# Patient Record
Sex: FEMALE | Race: BLACK OR AFRICAN AMERICAN | HISPANIC OR LATINO | Employment: FULL TIME | URBAN - METROPOLITAN AREA
[De-identification: names, ages, dates, MRNs, and addresses within clinical notes are randomized per-mention and may not be internally consistent; named-entity substitution may affect disease eponyms.]

---

## 2017-01-26 LAB
HBV SURFACE AG SERPL QL IA: NEGATIVE
HIV 1+2 AB+HIV1 P24 AG SERPL QL IA: NEGATIVE
RUBELLA ABY IGG: NORMAL

## 2017-03-13 ENCOUNTER — PRE VISIT (OUTPATIENT)
Dept: MATERNAL FETAL MEDICINE | Facility: CLINIC | Age: 37
End: 2017-03-13

## 2017-03-20 ENCOUNTER — OFFICE VISIT (OUTPATIENT)
Dept: MATERNAL FETAL MEDICINE | Facility: CLINIC | Age: 37
End: 2017-03-20
Attending: OBSTETRICS & GYNECOLOGY
Payer: COMMERCIAL

## 2017-03-20 ENCOUNTER — HOSPITAL ENCOUNTER (OUTPATIENT)
Dept: ULTRASOUND IMAGING | Facility: CLINIC | Age: 37
Discharge: HOME | End: 2017-03-20
Attending: OBSTETRICS & GYNECOLOGY | Admitting: OBSTETRICS & GYNECOLOGY
Payer: COMMERCIAL

## 2017-03-20 DIAGNOSIS — O26.90 PREGNANCY RELATED CONDITION, UNSPECIFIED TRIMESTER: ICD-10-CM

## 2017-03-20 DIAGNOSIS — O30.049 DICHORIONIC DIAMNIOTIC TWIN PREGNANCY: Primary | ICD-10-CM

## 2017-03-20 DIAGNOSIS — O30.049 DICHORIONIC DIAMNIOTIC TWIN PREGNANCY: ICD-10-CM

## 2017-03-20 DIAGNOSIS — O09.522 AMA (ADVANCED MATERNAL AGE) MULTIGRAVIDA 35+, SECOND TRIMESTER: Primary | ICD-10-CM

## 2017-03-20 DIAGNOSIS — O09.522 AMA (ADVANCED MATERNAL AGE) MULTIGRAVIDA 35+, SECOND TRIMESTER: ICD-10-CM

## 2017-03-20 PROCEDURE — 96040 ZZH GENETIC COUNSELING, EACH 30 MINUTES: CPT | Mod: ZF | Performed by: GENETIC COUNSELOR, MS

## 2017-03-20 PROCEDURE — 76812 OB US DETAILED ADDL FETUS: CPT

## 2017-03-20 NOTE — PROGRESS NOTES
[facility]  Genetic Counseling Consult    Patient:  Katie Soto YOB: 1980   Date of Service:  3/20/17      Katie Soto was seen with her  Radha at the [facility] for genetic consultation as part of her appointment for comprehensive ultrasound.  The indication for genetic counseling is advanced maternal age.       Impression/Plan:   1. Katie has declined serum screening in this pregnancy.    2. Katie had a comprehensive (level II) ultrasound today.  Please see the ultrasound report for further details.    3. The patient declines genetic amniocentesis and maternal serum screening today.    Pregnancy History:   /Parity:    Age at Delivery: 36 year old  VIVI: 2017, by Last Menstrual Period  Gestational Age: 18w4d    No significant complications or exposures were reported in the current pregnancy.    This is a di/di twin pregnancy.    Medical History:   Katie s reported medical history is not expected to impact pregnancy management or risks to fetal development.       Family History:   A three-generation pedigree was obtained, and is scanned under the  Media  tab.   The following significant findings were reported by Katie:    Patient works at home for [employer].   is a inpatient mental health worker.    They have a 4 year old who is healthy.    Otherwise, the reported family history is negative for multiple miscarriages, stillbirths, birth defects, mental retardation, known genetic conditions, and consanguinity.       Carrier Screening:   Patient is  and  is [place].  He states he is AA, thus not a carrier of sickle cell anemia.     Risk Assessment for Chromosome Conditions:   We explained that the risk for fetal chromosome abnormalities increases with maternal age. We discussed specific features of common chromosome abnormalities, including  Down syndrome, trisomy 13, trisomy 18, and sex chromosome trisomies.      - At age 36 at delivery with twins, the risk to have a baby with Down syndrome is 1 in 108.     - At age 36 at delivery with twins, the risk to have a baby with any chromosome abnormality is 1 in 53      Katie did not have maternal serum screening earlier in pregnancy.           Testing Options:   We discussed the following options:   Non-invasive Prenatal Testing (NIPT)    Maternal plasma cell-free DNA testing; first trimester ultrasound with nuchal translucency and nasal bone assessment is recommended, when appropriate    Screens for fetal trisomy 21, trisomy 13, trisomy 18, and sex chromosome aneuploidy    Cannot screen for open neural tube defects; maternal serum AFP after 15 weeks is recommended     Genetic Amniocentesis    Invasive procedure typically performed in the second trimester by which amniotic fluid is obtained for the purpose of chromosome analysis and/or other prenatal genetic analysis    Diagnostic results; >99% sensitivity for fetal chromosome abnormalities    AFAFP measurement tests for open neural tube defects     Comprehensive (Level II) ultrasound: Detailed ultrasound performed between 18-22 weeks gestation to screen for major birth defects and markers for aneuploidy.        We reviewed the benefits and limitations of this testing.  Screening tests provide a risk assessment specific to the pregnancy for certain fetal chromosome abnormalities, but cannot definitively diagnose or exclude a fetal chromosome abnormality.  Follow-up genetic counseling and consideration of diagnostic testing is recommended with any abnormal screening result.     Diagnostic tests carry inherent risks- including risk of miscarriage- that require careful consideration.  These tests can detect fetal chromosome abnormalities with greater than 99% certainty.  Results can be compromised by maternal cell contamination or mosaicism, and are limited  by the resolution of cytogenetic G-banding technology.  There is no screening nor diagnostic test that can detect all forms of birth defects or mental disability.    It was a pleasure to be involved with [facility] s care. Face-to-face time of the meeting was 30 minutes.      Saida Arthur, [facility]  Certified Genetic Counselor  Pager: [phone]

## 2017-03-20 NOTE — MR AVS SNAPSHOT
After Visit Summary   3/20/2017    Katie Soto    MRN: [id number]           Patient Information     Date Of Birth          1980        Visit Information        Provider Department      3/20/2017 1:30 PM Saida Arthur GC [facility] Maternal Fetal Medicine [facility]        Today's Diagnoses     AMA (advanced maternal age) multigravida 35+, second trimester    -  1    Pregnancy related condition, unspecified trimester        Dichorionic diamniotic twin pregnancy           Follow-ups after your visit        Your next 10 appointments already scheduled     Apr 10, 2017  9:30 AM CDT   M US COMPRE TWINS F/U with [id number]   [facility] Maternal Fetal Medicine Ultrasound - [facility] ([facility])    [address] [place] [address]  [facility] [address] [phone]           Wear comfortable clothes and leave your valuables at home.            Apr 10, 2017 10:00 AM CDT   Radiology MD with [facility] MD   [facility] Maternal Fetal Medicine [facility] ([facility])    [address] [place] [id number] [phone]           Please arrive at the time given for your first appointment.  This visit is used internally to schedule the physician's time during your ultrasound.              Future tests that were ordered for you today     Open Future Orders        Priority Expected Expires Ordered    MFM US Comprehensive Twins F/U Routine 4/17/2017 1/20/2018 3/20/2017            Who to contact     If you have questions or need follow up information about today's clinic visit or your schedule please contact [facility] FET[facility] directly at [phone].  Normal or non-critical lab and imaging results will be communicated to you by MyChart, letter or phone within 4 business days after the clinic has received the results. If you do not hear from us within 7 days, please contact the clinic through MyChart or phone. If you have a critical  "or abnormal lab result, we will notify you by phone as soon as possible.  Submit refill requests through [employer] or call your pharmacy and they will forward the refill request to us. Please allow 3 business days for your refill to be completed.          Additional Information About Your Visit        [employer] Information     [employer] lets you send messages to your doctor, view your test results, renew your prescriptions, schedule appointments and more. To sign up, go to [email or web address] . Click on \"Log in\" on the left side of the screen, which will take you to the Welcome page. Then click on \"Sign up Now\" on the right side of the page.     You will be asked to enter the access code listed below, as well as some personal information. Please follow the directions to create your username and password.     Your access code is: [email or web address]  Expires: 2017  3:34 PM     Your access code will  in 90 days. If you need help or a new code, please call your [place] clinic or [phone].        Care EveryWhere ID     This is your Care EveryWhere ID. This could be used by other organizations to access your [place] medical records  [id number]        Your Vitals Were     Last Period                   11/10/2016            Blood Pressure from Last 3 Encounters:   02/28/15 107/66   12 108/54   12 117/63    Weight from Last 3 Encounters:   02/28/15 93.4 kg (206 lb)   12 107.5 kg (237 lb)   12 102.1 kg (225 lb)              We Performed the Following     MFM Genetic Counseling        Primary Care Provider Office Phone # Fax #    Linda Meraz [phone]       [facility] [address]  [facility] [address]        Thank you!     Thank you for choosing [facility]  for your care. Our goal is always to provide you with excellent care. Hearing back from our patients is one way we can continue to improve our services. Please take a " few minutes to complete the written survey that you may receive in the mail after your visit with us. Thank you!             Your Updated Medication List - Protect others around you: Learn how to safely use, store and throw away your medicines at www.disposemymeds.org.          This list is accurate as of: 3/20/17  3:57 PM.  Always use your most recent med list.                   Brand Name Dispense Instructions for use    ibuprofen 400-800 mg tablet    ADVIL,MOTRIN    60 tablet    Take 1-2 tablets by mouth every 6 hours as needed (cramping).       PRENATAL VITAMINS PO      Take 1 tablet by mouth daily.       senna-docusate 8.6-50 MG per tablet    SENOKOT-S;PERICOLACE    60 tablet    Take 1-2 tablets by mouth 2 times daily.

## 2017-03-20 NOTE — MR AVS SNAPSHOT
"              After Visit Summary   3/20/2017    Katie Soto    MRN: [id number]           Patient Information     Date Of Birth          1980        Visit Information        Provider Department      3/20/2017 3:30 PM Brittani Bingham, DO [facility]  [place]        Today's Diagnoses     Dichorionic diamniotic twin pregnancy    -  1    AMA (advanced maternal age) multigravida 35+, second trimester           Follow-ups after your visit        Future tests that were ordered for you today     Open Future Orders        Priority Expected Expires Ordered    [facility] Comprehensive Twins F/U Routine 4/17/2017 1/20/2018 3/20/2017            Who to contact     If you have questions or need follow up information about today's clinic visit or your schedule please contact [facility] FETAL MEDIC[facility] directly at [phone].  Normal or non-critical lab and imaging results will be communicated to you by [employer], letter or phone within 4 business days after the clinic has received the results. If you do not hear from us within 7 days, please contact the clinic through [employer] or phone. If you have a critical or abnormal lab result, we will notify you by phone as soon as possible.  Submit refill requests through [employer] or call your pharmacy and they will forward the refill request to us. Please allow 3 business days for your refill to be completed.          Additional Information About Your Visit        [employer] Information     [employer] lets you send messages to your doctor, view your test results, renew your prescriptions, schedule appointments and more. To sign up, go to [email or web address] . Click on \"Log in\" on the left side of the screen, which will take you to the Welcome page. Then click on \"Sign up Now\" on the right side of the page.     You will be asked to enter the access code listed below, as well as some personal information. Please follow the directions to create " your username and password.     Your access code is: [email or web address]  Expires: 2017  3:34 PM     Your access code will  in 90 days. If you need help or a new code, please call your [place] clinic or [phone].        Care EveryWhere ID     This is your Care EveryWhere ID. This could be used by other organizations to access your [place] medical records  [id number]        Your Vitals Were     Last Period                   11/10/2016            Blood Pressure from Last 3 Encounters:   02/28/15 107/66   12 108/54   12 117/63    Weight from Last 3 Encounters:   02/28/15 93.4 kg (206 lb)   12 107.5 kg (237 lb)   12 102.1 kg (225 lb)               Primary Care Provider Office Phone # Fax #    Linda Meraz [phone]       [facility] [address] [facility] [address]  [facility] [address]        Thank you!     Thank you for choosing [facility]  for your care. Our goal is always to provide you with excellent care. Hearing back from our patients is one way we can continue to improve our services. Please take a few minutes to complete the written survey that you may receive in the mail after your visit with us. Thank you!             Your Updated Medication List - Protect others around you: Learn how to safely use, store and throw away your medicines at www.disposemymeds.org.          This list is accurate as of: 3/20/17  3:34 PM.  Always use your most recent med list.                   Brand Name Dispense Instructions for use    ibuprofen 400-800 mg tablet    ADVIL,MOTRIN    60 tablet    Take 1-2 tablets by mouth every 6 hours as needed (cramping).       PRENATAL VITAMINS PO      Take 1 tablet by mouth daily.       senna-docusate 8.6-50 MG per tablet    SENOKOT-S;PERICOLACE    60 tablet    Take 1-2 tablets by mouth 2 times daily.

## 2017-03-20 NOTE — PROGRESS NOTES
"Please see \"Imaging\" tab under \"Chart Review\" for details of today's US.      Brittani Bingham, DO  Maternal-Fetal Medicine  March 20, 2017      "

## 2017-04-10 ENCOUNTER — HOSPITAL ENCOUNTER (OUTPATIENT)
Dept: ULTRASOUND IMAGING | Facility: CLINIC | Age: 37
Discharge: HOME | End: 2017-04-10
Attending: OBSTETRICS & GYNECOLOGY | Admitting: OBSTETRICS & GYNECOLOGY
Payer: COMMERCIAL

## 2017-04-10 ENCOUNTER — OFFICE VISIT (OUTPATIENT)
Dept: MATERNAL FETAL MEDICINE | Facility: CLINIC | Age: 37
End: 2017-04-10
Attending: OBSTETRICS & GYNECOLOGY
Payer: COMMERCIAL

## 2017-04-10 DIAGNOSIS — O40.2XX2 POLYHYDRAMNIOS IN SECOND TRIMESTER, ANTEPARTUM COMPLICATION, FETUS 2: ICD-10-CM

## 2017-04-10 DIAGNOSIS — O09.522 AMA (ADVANCED MATERNAL AGE) MULTIGRAVIDA 35+, SECOND TRIMESTER: ICD-10-CM

## 2017-04-10 DIAGNOSIS — O30.049 DICHORIONIC DIAMNIOTIC TWIN PREGNANCY: Primary | ICD-10-CM

## 2017-04-10 DIAGNOSIS — O30.049 DICHORIONIC DIAMNIOTIC TWIN PREGNANCY: ICD-10-CM

## 2017-04-10 PROCEDURE — 76816 OB US FOLLOW-UP PER FETUS: CPT | Mod: 59

## 2017-04-10 NOTE — MR AVS SNAPSHOT
"                  MRN:[id number]                      After Visit Summary   4/10/2017    Katie Soto    MRN: [id number]           Visit Information        Provider Department      4/10/2017 10:00 AM Pierre Daley MD [facility]        [facility] Information     [employer] lets you send messages to your doctor, view your test results, renew your prescriptions, schedule appointments and more. To sign up, go to [email or web address] . Click on \"Log in\" on the left side of the screen, which will take you to the Welcome page. Then click on \"Sign up Now\" on the right side of the page.     You will be asked to enter the access code listed below, as well as some personal information. Please follow the directions to create your username and password.     Your access code is: [email or web address]  Expires: 2017  3:34 PM     Your access code will  in 90 days. If you need help or a new code, please call your [place] clinic or [phone].        Care EveryWhere ID     This is your Care EveryWhere ID. This could be used by other organizations to access your [place] medical records  [id number]        "

## 2017-04-10 NOTE — PROGRESS NOTES
Please refer to ultrasound report under 'Imaging' Studies of 'Chart Review' tabs.    Pierre Daley M.D.

## 2017-06-22 ENCOUNTER — HOSPITAL ENCOUNTER (INPATIENT)
Facility: CLINIC | Age: 37
LOS: 38 days | Discharge: HOME | End: 2017-07-30
Attending: OBSTETRICS & GYNECOLOGY | Admitting: OBSTETRICS & GYNECOLOGY
Payer: COMMERCIAL

## 2017-06-22 DIAGNOSIS — Z98.891 S/P PRIMARY LOW TRANSVERSE C-SECTION: ICD-10-CM

## 2017-06-22 LAB
ABO + RH BLD: NORMAL
ABO + RH BLD: NORMAL
ALBUMIN UR-MCNC: 10 MG/DL
APPEARANCE UR: CLEAR
BACTERIA #/AREA URNS HPF: ABNORMAL /HPF
BASOPHILS # BLD AUTO: 0 10E9/L (ref 0–0.2)
BASOPHILS NFR BLD AUTO: 0.2 %
BILIRUB UR QL STRIP: NEGATIVE
BLD GP AB SCN SERPL QL: NORMAL
BLOOD BANK CMNT PATIENT-IMP: NORMAL
COLOR UR AUTO: YELLOW
DIFFERENTIAL METHOD BLD: ABNORMAL
EOSINOPHIL # BLD AUTO: 0.1 10E9/L (ref 0–0.7)
EOSINOPHIL NFR BLD AUTO: 0.7 %
ERYTHROCYTE [DISTWIDTH] IN BLOOD BY AUTOMATED COUNT: 14.2 % (ref 10–15)
GLUCOSE UR STRIP-MCNC: NEGATIVE MG/DL
HCT VFR BLD AUTO: 33.1 % (ref 35–47)
HGB BLD-MCNC: 11.4 G/DL (ref 11.7–15.7)
HGB UR QL STRIP: NEGATIVE
IMM GRANULOCYTES # BLD: 0.1 10E9/L (ref 0–0.4)
IMM GRANULOCYTES NFR BLD: 0.7 %
KETONES UR STRIP-MCNC: 10 MG/DL
LEUKOCYTE ESTERASE UR QL STRIP: ABNORMAL
LYMPHOCYTES # BLD AUTO: 2.2 10E9/L (ref 0.8–5.3)
LYMPHOCYTES NFR BLD AUTO: 20.5 %
MCH RBC QN AUTO: 27.5 PG (ref 26.5–33)
MCHC RBC AUTO-ENTMCNC: 34.4 G/DL (ref 31.5–36.5)
MCV RBC AUTO: 80 FL (ref 78–100)
MONOCYTES # BLD AUTO: 1.2 10E9/L (ref 0–1.3)
MONOCYTES NFR BLD AUTO: 11.3 %
MUCOUS THREADS #/AREA URNS LPF: PRESENT /LPF
NEUTROPHILS # BLD AUTO: 7.2 10E9/L (ref 1.6–8.3)
NEUTROPHILS NFR BLD AUTO: 66.6 %
NITRATE UR QL: NEGATIVE
NRBC # BLD AUTO: 0 10*3/UL
NRBC BLD AUTO-RTO: 0 /100
PH UR STRIP: 7 PH (ref 5–7)
PLATELET # BLD AUTO: 258 10E9/L (ref 150–450)
RBC # BLD AUTO: 4.14 10E12/L (ref 3.8–5.2)
RBC #/AREA URNS AUTO: 2 /HPF (ref 0–2)
SP GR UR STRIP: 1.01 (ref 1–1.03)
SPECIMEN EXP DATE BLD: NORMAL
SQUAMOUS #/AREA URNS AUTO: 2 /HPF (ref 0–1)
URN SPEC COLLECT METH UR: ABNORMAL
UROBILINOGEN UR STRIP-MCNC: 2 MG/DL (ref 0–2)
WBC # BLD AUTO: 10.7 10E9/L (ref 4–11)
WBC #/AREA URNS AUTO: 7 /HPF (ref 0–2)

## 2017-06-22 PROCEDURE — 86850 RBC ANTIBODY SCREEN: CPT | Performed by: OBSTETRICS & GYNECOLOGY

## 2017-06-22 PROCEDURE — 25000125 ZZHC RX 250: Performed by: OBSTETRICS & GYNECOLOGY

## 2017-06-22 PROCEDURE — 25000125 ZZHC RX 250

## 2017-06-22 PROCEDURE — 85025 COMPLETE CBC W/AUTO DIFF WBC: CPT | Performed by: OBSTETRICS & GYNECOLOGY

## 2017-06-22 PROCEDURE — 36415 COLL VENOUS BLD VENIPUNCTURE: CPT | Performed by: OBSTETRICS & GYNECOLOGY

## 2017-06-22 PROCEDURE — 81001 URINALYSIS AUTO W/SCOPE: CPT | Performed by: OBSTETRICS & GYNECOLOGY

## 2017-06-22 PROCEDURE — 12000029 ZZH R&B OB INTERMEDIATE

## 2017-06-22 PROCEDURE — 86900 BLOOD TYPING SEROLOGIC ABO: CPT | Performed by: OBSTETRICS & GYNECOLOGY

## 2017-06-22 PROCEDURE — 86780 TREPONEMA PALLIDUM: CPT | Performed by: OBSTETRICS & GYNECOLOGY

## 2017-06-22 PROCEDURE — 86901 BLOOD TYPING SEROLOGIC RH(D): CPT | Performed by: OBSTETRICS & GYNECOLOGY

## 2017-06-22 PROCEDURE — 25000128 H RX IP 250 OP 636: Performed by: OBSTETRICS & GYNECOLOGY

## 2017-06-22 RX ORDER — MAGNESIUM SULFATE IN WATER 40 MG/ML
2 INJECTION, SOLUTION INTRAVENOUS CONTINUOUS
Status: DISCONTINUED | OUTPATIENT
Start: 2017-06-22 | End: 2017-06-23

## 2017-06-22 RX ORDER — SODIUM CHLORIDE, SODIUM LACTATE, POTASSIUM CHLORIDE, CALCIUM CHLORIDE 600; 310; 30; 20 MG/100ML; MG/100ML; MG/100ML; MG/100ML
INJECTION, SOLUTION INTRAVENOUS CONTINUOUS
Status: DISCONTINUED | OUTPATIENT
Start: 2017-06-22 | End: 2017-06-23

## 2017-06-22 RX ORDER — TERBUTALINE SULFATE 1 MG/ML
INJECTION, SOLUTION SUBCUTANEOUS
Status: COMPLETED
Start: 2017-06-22 | End: 2017-06-22

## 2017-06-22 RX ORDER — ONDANSETRON 2 MG/ML
4 INJECTION INTRAMUSCULAR; INTRAVENOUS EVERY 6 HOURS PRN
Status: DISCONTINUED | OUTPATIENT
Start: 2017-06-22 | End: 2017-07-04

## 2017-06-22 RX ORDER — TERBUTALINE SULFATE 1 MG/ML
0.25 INJECTION, SOLUTION SUBCUTANEOUS
Status: COMPLETED | OUTPATIENT
Start: 2017-06-22 | End: 2017-06-22

## 2017-06-22 RX ORDER — CALCIUM GLUCONATE 94 MG/ML
1 INJECTION, SOLUTION INTRAVENOUS
Status: DISCONTINUED | OUTPATIENT
Start: 2017-06-22 | End: 2017-07-06

## 2017-06-22 RX ORDER — MAGNESIUM SULFATE HEPTAHYDRATE 40 MG/ML
4 INJECTION, SOLUTION INTRAVENOUS ONCE
Status: COMPLETED | OUTPATIENT
Start: 2017-06-22 | End: 2017-06-22

## 2017-06-22 RX ADMIN — TERBUTALINE SULFATE 0.25 MG: 1 INJECTION SUBCUTANEOUS at 12:00

## 2017-06-22 RX ADMIN — MAGNESIUM SULFATE IN WATER 4 G: 40 INJECTION, SOLUTION INTRAVENOUS at 15:31

## 2017-06-22 RX ADMIN — TERBUTALINE SULFATE 0.25 MG: 1 INJECTION, SOLUTION SUBCUTANEOUS at 12:00

## 2017-06-22 RX ADMIN — MAGNESIUM SULFATE IN WATER 2 G/HR: 40 INJECTION, SOLUTION INTRAVENOUS at 16:10

## 2017-06-22 NOTE — IP AVS SNAPSHOT
[address]    [place] [address]    Phone:  [phone]                                       After Visit Summary   6/22/2017    Katie Soto    MRN: [id number]           After Visit Summary Signature Page     I have received my discharge instructions, and my questions have been answered. I have discussed any challenges I see with this plan with the nurse or doctor.    ..........................................................................................................................................  Patient/Patient Representative Signature      ..........................................................................................................................................  Patient Representative Print Name and Relationship to Patient    ..................................................               ................................................  Date                                            Time    ..........................................................................................................................................  Reviewed by Signature/Title    ...................................................              ..............................................  Date                                                            Time

## 2017-06-22 NOTE — PLAN OF CARE
1125 -  at 32+2/7 weeks with TWINS presents to [facility] from OB clinic for eval of PTL.  Patient denies contractions, VB or LOF, but has been slowly changing her cervix with every OB appt for the past few weeks.  Cervix today per Dr. Meraz in clinic was 4-.  POC discussed including monitoring, possible medications for contractions.  Patient agrees to POC.  Monitors applied.  Admission assessment completed.  Monitors being held in place in attempt to monitor both babies.      1200 - Terbutaline discussed with patient and  who verbally agree to med.  Terbutaline given per Dr. Meraz's orders.  Dr. Meraz at bedside for eval.  FHT strip reviewed.  Order for NST and removal of EUS received.  Continuous toco to remain in place.  Patient agrees.    1225 - Difficulty tracing 2 FHTs'.  Monitors adjusted.  Two FHT tracing noted with holding monitors in place.      1252 - FHT's reassuring and reactive x2.  EUS removed.  Bennet remains.  Patient agrees.  Patient noting contractions as seen on monitor.

## 2017-06-22 NOTE — IP AVS SNAPSHOT
MRN:[id number]                      After Visit Summary   2017    Katie Soto    MRN: [id number]           Thank you!     Thank you for choosing [facility] for your care. Our goal is always to provide you with excellent care. Hearing back from our patients is one way we can continue to improve our services. Please take a few minutes to complete the written survey that you may receive in the mail after you visit with us. Thank you!        Patient Information     Date Of Birth          1980        About your hospital stay     You were admitted on:  2017 You last received care in the:  [address]    You were discharged on:  2017       Who to Call     For medical emergencies, please call 911.  For non-urgent questions about your medical care, please call your primary care provider or clinic, [phone]  For questions related to your surgery, please call your surgery clinic        Attending Provider     Provider Specialty    Tiago Dozier MD OB/Gyn    Linda Meraz MD OB/Gyn       Primary Care Provider Office Phone # Fax #    Maggie CHAVA Davenport [phone]      After Care Instructions     Activity       Review discharge instructions            Diet       Resume previous diet            Discharge Instructions - Gestational diabetic patients       Gestational diabetic patients to follow up for fasting blood sugar and 2 hour 75gm glucose load at 6 weeks postpartum.            Discharge Instructions - Postpartum visit       Schedule postpartum visit with your provider and return to clinic in 6 weeks.                  Further instructions from your care team       Postop  Birth Instructions    Activity       Do not lift more than 10 pounds for 6 weeks after surgery.  Ask family and friends for help when you need it.    No driving until you have stopped taking your pain medications (usually two weeks after  surgery).    No heavy exercise or activity for 6 weeks.  Don't do anything that will put a strain on your surgery site.    Don't strain when using the toilet.  Your care team may prescribe a stool softener if you have problems with your bowel movements.     To care for your incision:       Keep the incision clean and dry.    Do not soak your incision in water. No swimming or hot tubs until it has fully healed. You may soak in the bathtub if the water level is below your incision.    Do not use peroxide, gel, cream, lotion, or ointment on your incision.    Adjust your clothes to avoid pressure on your surgery site (check the elastic in your underwear for example).     You may see a small amount of clear or pink drainage and this is normal.  Check with your health care provider:       If the drainage increases or has an odor.    If the incision reddens, you have swelling, or develop a rash.    If you have increased pain and the medicine we prescribed doesn't help.    If you have a fever above 100.4 F (38 C) with or without chills when placing thermometer under your tongue.   The area around your incision (surgery wound), will feel numb.  This is normal. The numbness should go away in less than a year.     Keep your hands clean:  Always wash your hands before touching your incision (surgery wound). This helps reduce your risk of infection. If your hands aren't dirty, you may use an alcohol hand-rub to clean your hands. Keep your nails clean and short.    Call your healthcare provider if you have any of these symptoms:       You soak a sanitary pad with blood within 1 hour, or you see blood clots larger than a golf ball.    Bleeding that lasts more than 6 weeks.    Vaginal discharge that smells bad.    Severe pain, cramping or tenderness in your lower belly area.    A need to urinate more frequently (use the toilet more often), more urgently (use the toilet very quickly), or it burns when you urinate.    Nausea and  "vomiting.    Redness, swelling or pain around a vein in your leg.    Problems breastfeeding or a red or painful area on your breast.    Chest pain and cough or are gasping for air.    Problems with coping with sadness, anxiety or depression. If you have concerns about hurting yourself or the baby, call your provider immediately.      You have questions or concerns after you return home.                  Pending Results     No orders found from 2017 to 2017.            Statement of Approval     Ordered          17 0651  I have reviewed and agree with all the recommendations and orders detailed in this document.  EFFECTIVE NOW     Approved and electronically signed by:  Jon Sarah MD             Admission Information     Date & Time Provider Department Dept. Phone    2017 Linda Meraz MD [address] [phone]      Your Vitals Were     Blood Pressure Pulse Temperature Respirations Height Weight    118/73 74 98.7  F (37.1  C) (Oral) 18 1.676 m (5' 6\") 113.7 kg (250 lb 12 oz)    Last Period Pulse Oximetry BMI (Body Mass Index)             11/10/2016 100% 40.47 kg/m2         [employer] Information     [employer] lets you send messages to your doctor, view your test results, renew your prescriptions, schedule appointments and more. To sign up, go to [email or web address] . Click on \"Log in\" on the left side of the screen, which will take you to the Welcome page. Then click on \"Sign up Now\" on the right side of the page.     You will be asked to enter the access code listed below, as well as some personal information. Please follow the directions to create your username and password.     Your access code is: [email or web address]  Expires: 10/28/2017  9:53 AM     Your access code will  in 90 days. If you need help or a new code, please call your [facility] or [phone].        Care EveryWhere ID     This is your Care EveryWhere ID. This could be used by other " organizations to access your [facility] medical records  [id number]        Equal Access to Services     PRANEETH SOOD : Hadii davi Leon, wapiotrda ivette, qajohnta brandonmatony montes. So [facility] [phone].    ATENCIÓN: Si habla español, tiene a rodas disposición servicios gratuitos de asistencia lingüística. Llame al [phone].    We comply with applicable federal civil rights laws and [place] laws. We do not discriminate on the basis of race, color, national origin, age, disability sex, sexual orientation or gender identity.               Review of your medicines      UNREVIEWED medicines. Ask your doctor about these medicines        Dose / Directions    breast pump Misc   Used for:  Postpartum care and examination of lactating mother   Ask about: Should I take this medication?        Dose:  1 each   1 each once for 1 dose   Quantity:  1 each   Refills:  0         START taking        Dose / Directions    ibuprofen 400 MG tablet   Commonly known as:  ADVIL/MOTRIN        Dose:  400 mg   Take 1 tablet (400 mg) by mouth every 6 hours as needed for other (cramping)   Quantity:  40 tablet   Refills:  0       oxyCODONE 5 MG IR tablet   Commonly known as:  ROXICODONE        Dose:  5 mg   Take 1 tablet (5 mg) by mouth every 4 hours as needed for moderate to severe pain   Quantity:  30 tablet   Refills:  0         CONTINUE these medicines which have NOT CHANGED        Dose / Directions    albuterol 108 (90 BASE) MCG/ACT Inhaler   Commonly known as:  PROAIR HFA/PROVENTIL HFA/VENTOLIN HFA        Dose:  2 puff   Inhale 2 puffs into the lungs   Refills:  0       PRENATAL VITAMINS PO        Dose:  1 tablet   Take 1 tablet by mouth daily.   Refills:  0            Where to get your medicines      These medications were sent to [facility] - [address]  [address] [place] [address]     Phone:  [phone]     ibuprofen 400 MG  tablet         Some of these will need a paper prescription and others can be bought over the counter. Ask your nurse if you have questions.     Bring a paper prescription for each of these medications     breast pump Misc    oxyCODONE 5 MG IR tablet                Protect others around you: Learn how to safely use, store and throw away your medicines at www.disposemymeds.org.             Medication List: This is a list of all your medications and when to take them. Check marks below indicate your daily home schedule. Keep this list as a reference.      Medications           Morning Afternoon Evening Bedtime As Needed    albuterol 108 (90 BASE) MCG/ACT Inhaler   Commonly known as:  PROAIR HFA/PROVENTIL HFA/VENTOLIN HFA   Inhale 2 puffs into the lungs                                ibuprofen 400 MG tablet   Commonly known as:  ADVIL/MOTRIN   Take 1 tablet (400 mg) by mouth every 6 hours as needed for other (cramping)   Last time this was given:  800 mg on 7/30/2017  9:07 AM                                oxyCODONE 5 MG IR tablet   Commonly known as:  ROXICODONE   Take 1 tablet (5 mg) by mouth every 4 hours as needed for moderate to severe pain   Last time this was given:  5 mg on 7/30/2017  9:07 AM                                PRENATAL VITAMINS PO   Take 1 tablet by mouth daily.                                  ASK your doctor about these medications           Morning Afternoon Evening Bedtime As Needed    breast pump Misc   1 each once for 1 dose   Ask about: Should I take this medication?

## 2017-06-22 NOTE — PLAN OF CARE
1430-Report received from Marianne Uriarte RN. Care of pt assumed.   1445-Verbal consent for EFM/[place] received. Monitors applied.   1510-PIV inserted without difficulty.   1530-Bedside report to Landy Abad RN to assume care of pt.

## 2017-06-22 NOTE — PLAN OF CARE
Received updated orders from Dr. Godfrey regarding regular adult diet and additional dose of Betamethasone.  Regular adult diet ordered and no dose of beta at this time.  Magnesium sulfate infusing at 2 grams an hour

## 2017-06-23 PROBLEM — O60.03 PRETERM LABOR IN THIRD TRIMESTER: Status: ACTIVE | Noted: 2017-06-23

## 2017-06-23 LAB — T PALLIDUM IGG+IGM SER QL: NEGATIVE

## 2017-06-23 PROCEDURE — 12000029 ZZH R&B OB INTERMEDIATE

## 2017-06-23 PROCEDURE — 25000128 H RX IP 250 OP 636: Performed by: OBSTETRICS & GYNECOLOGY

## 2017-06-23 PROCEDURE — 25000132 ZZH RX MED GY IP 250 OP 250 PS 637: Performed by: OBSTETRICS & GYNECOLOGY

## 2017-06-23 PROCEDURE — 25000125 ZZHC RX 250: Performed by: OBSTETRICS & GYNECOLOGY

## 2017-06-23 RX ORDER — NIFEDIPINE 10 MG/1
20 CAPSULE ORAL EVERY 30 MIN PRN
Status: DISCONTINUED | OUTPATIENT
Start: 2017-06-23 | End: 2017-07-06

## 2017-06-23 RX ORDER — NIFEDIPINE 10 MG/1
20 CAPSULE ORAL EVERY 6 HOURS
Status: COMPLETED | OUTPATIENT
Start: 2017-06-23 | End: 2017-07-06

## 2017-06-23 RX ORDER — NIFEDIPINE 10 MG/1
20 CAPSULE ORAL ONCE
Status: COMPLETED | OUTPATIENT
Start: 2017-06-23 | End: 2017-06-27

## 2017-06-23 RX ADMIN — MAGNESIUM SULFATE IN WATER 2 G/HR: 40 INJECTION, SOLUTION INTRAVENOUS at 02:04

## 2017-06-23 RX ADMIN — NIFEDIPINE 10 MG: 10 CAPSULE, LIQUID FILLED ORAL at 16:32

## 2017-06-23 RX ADMIN — NIFEDIPINE 20 MG: 10 CAPSULE, LIQUID FILLED ORAL at 22:27

## 2017-06-23 RX ADMIN — SODIUM CHLORIDE, POTASSIUM CHLORIDE, SODIUM LACTATE AND CALCIUM CHLORIDE: 600; 310; 30; 20 INJECTION, SOLUTION INTRAVENOUS at 02:19

## 2017-06-23 NOTE — PROGRESS NOTES
HD 2  37 yo  at 32+1 with di/di twin, PTL, advanced cervical dilation    Venedocia occ  NST reactive x 2 this am  Cx unchanged per RN exam. No cx check by MD this am since patient is stable    Continue magnesium until 1500  Observe off magnesium and consider Nifedipine po if necessary  Possible discharge on  if stable

## 2017-06-23 NOTE — PLAN OF CARE
1300- Dr Alexander updated regarding sl. Irritability with 2 ctx lasting approx. 120 sec at 1300- Pt rated ctx at 7.    1600- reactive NST x 2- NON per Dr KETURAH Dozier to start Nifedipine 10mg q 6 hr -plan is to observe pt overnight and re evaluate in AM. Pt verbally acknowledges plan with understanding.

## 2017-06-23 NOTE — PLAN OF CARE
2230: Dr. DAYA Dozier notified on attempt to monitor FHTs. Was able to get a reactive strip on baby A, but unable to keep baby B on monitor, was able to get a 10 min strip with moderate variability, and RN could palpate frequent fetal movement. Dr. Dozier was comfortable with taking both babies off the monitor and monitoring again at 0600. Plan to continue continuous TOCO. Also updated on cxt frequency.   2315: Dr. DAYA Dozier in department, viewed previous FHT strip. Plan remains the same to monitor babies again at 0600.  0430: Patient c/o increased back pain, encouraged to void, heat packs applied. Pt also feeling pressure as if she needs to have a bowel movement, SVE 5.5/85/-2  0500: After bowel movement pt no longer feeling rectal pressure. Back pain also improved.  0700: Dr. DAYA Dozier at bedside to round on patient. Reviewed FHTs, OK to monitor FHT qshift with continuous TOCO. Plan to possibly shut of Magnesium this afternoon, MD will follow up with day shift RN later today.  0715: Report given to Azeb STAPLETON RN who will assume cares       Addendum: Above note states Dr. RICH Dozier was managing patient, but correction- Dr. KETURAH Dozier was on call and managing patient.

## 2017-06-23 NOTE — PLAN OF CARE
1900  Dr. Dozier updated that despite continuous attempts to obtain fetal tracing on baby B, unable to keep fetal tracing on baby B on the monitor.  Heart tones obtained per doppler, heart tones 145 with doppler used.  Order received for continuous toco and to obtain NST at 2100.  Plan explained to patient who is in agreement with plan

## 2017-06-23 NOTE — PROGRESS NOTES
Patient has been off magnesium since 1300. Though her contractions are not regular, there is a subtle sense of increased uterine activity. Plan to start po Nifedipine. Due to prematurity and advanced cervical dilation, plan is to hold off on discharge until she has shown prolonged stability.

## 2017-06-24 PROCEDURE — 25000132 ZZH RX MED GY IP 250 OP 250 PS 637: Performed by: OBSTETRICS & GYNECOLOGY

## 2017-06-24 PROCEDURE — 12000029 ZZH R&B OB INTERMEDIATE

## 2017-06-24 RX ADMIN — NIFEDIPINE 20 MG: 10 CAPSULE, LIQUID FILLED ORAL at 22:45

## 2017-06-24 RX ADMIN — NIFEDIPINE 20 MG: 10 CAPSULE, LIQUID FILLED ORAL at 04:49

## 2017-06-24 RX ADMIN — NIFEDIPINE 10 MG: 10 CAPSULE, LIQUID FILLED ORAL at 10:34

## 2017-06-24 RX ADMIN — NIFEDIPINE 10 MG: 10 CAPSULE, LIQUID FILLED ORAL at 10:28

## 2017-06-24 NOTE — PROGRESS NOTES
"HD3  Overnight feels ctx 3/hr. Occ has vaginal pain and pressure. Denies LOF. Continues on nifedipine 20 mg q6hrs.    /62  Pulse 86  Temp 99  F (37.2  C)  Resp 16  Ht 1.676 m (5' 6\")  Wt 111.1 kg (245 lb)  LMP 11/10/2016  BMI 39.54 kg/m2   NAD  Abd: Soft, NT  Ext: NT, no edema  SVE 5.5/80/-2, soft    A/P: 36 year old  @ 32w2d with di/di twins, arrested PTL and ACD. Continues on nifedipine tocolysis.  - Given persistent contractions, pt lives 45 minutes away, will keep inpatient on bedrest and nifedipine.  - Reviewed mode of delivery. She has h/o  7.5# baby and plans vaginal delivery. Babies were most recently vtx/transverse. Reviewed delivery options for non-vertex second twin. I discussed breech extraction vs. External cephalic version vs.  section. I would recommend breech extraction, discussed risks to baby, however she is a good candidate given previous delivery and size of current babies.   - Jain: pt will refuse blood transfusion, even in event of life-threatening blood loss. She would accept use of cell saver and transfusion of her own blood.  - If not delivered by Monday, will get US for BPP    Ira Dozier   "

## 2017-06-24 NOTE — PLAN OF CARE
"2100 pt states she is having some back pain, rates it as mild discomfort, declines any pain meds. Also states she is feeling some vaginal and rectal pressure. 1-3 contractions per hour with some irritability noted. SVE performed, pt 5.5/85/-2.     2230. FHTs of fetus A and B both reactive. Nifedipine given as ordered. VSS    0430. Pt has slept quietly all night. States she felt some mild back pain \"at some point through the night, but its better now\". Nifedipine given as ordered. VV  "

## 2017-06-25 ENCOUNTER — APPOINTMENT (OUTPATIENT)
Dept: ULTRASOUND IMAGING | Facility: CLINIC | Age: 37
End: 2017-06-25
Attending: OBSTETRICS & GYNECOLOGY
Payer: COMMERCIAL

## 2017-06-25 LAB
ABO + RH BLD: NORMAL
ABO + RH BLD: NORMAL
ALBUMIN SERPL-MCNC: 2.6 G/DL (ref 3.4–5)
ALBUMIN UR-MCNC: NEGATIVE MG/DL
ALP SERPL-CCNC: 151 U/L (ref 40–150)
ALT SERPL W P-5'-P-CCNC: 34 U/L (ref 0–50)
APPEARANCE UR: CLEAR
AST SERPL W P-5'-P-CCNC: 21 U/L (ref 0–45)
BILIRUB DIRECT SERPL-MCNC: 0.1 MG/DL (ref 0–0.2)
BILIRUB SERPL-MCNC: 0.6 MG/DL (ref 0.2–1.3)
BILIRUB UR QL STRIP: NEGATIVE
COLOR UR AUTO: YELLOW
ERYTHROCYTE [DISTWIDTH] IN BLOOD BY AUTOMATED COUNT: 14.3 % (ref 10–15)
GLUCOSE UR STRIP-MCNC: NEGATIVE MG/DL
HCT VFR BLD AUTO: 33.2 % (ref 35–47)
HGB BLD-MCNC: 11 G/DL (ref 11.7–15.7)
HGB UR QL STRIP: NEGATIVE
KETONES UR STRIP-MCNC: 5 MG/DL
LEUKOCYTE ESTERASE UR QL STRIP: NEGATIVE
MCH RBC QN AUTO: 26.6 PG (ref 26.5–33)
MCHC RBC AUTO-ENTMCNC: 33.1 G/DL (ref 31.5–36.5)
MCV RBC AUTO: 80 FL (ref 78–100)
MUCOUS THREADS #/AREA URNS LPF: PRESENT /LPF
NITRATE UR QL: NEGATIVE
PH UR STRIP: 6.5 PH (ref 5–7)
PLATELET # BLD AUTO: 243 10E9/L (ref 150–450)
PROT SERPL-MCNC: 6.4 G/DL (ref 6.8–8.8)
RBC # BLD AUTO: 4.13 10E12/L (ref 3.8–5.2)
RBC #/AREA URNS AUTO: <1 /HPF (ref 0–2)
SP GR UR STRIP: 1.01 (ref 1–1.03)
SPECIMEN EXP DATE BLD: NORMAL
SQUAMOUS #/AREA URNS AUTO: <1 /HPF (ref 0–1)
URN SPEC COLLECT METH UR: ABNORMAL
UROBILINOGEN UR STRIP-MCNC: NORMAL MG/DL (ref 0–2)
WBC # BLD AUTO: 9.8 10E9/L (ref 4–11)
WBC #/AREA URNS AUTO: 1 /HPF (ref 0–2)

## 2017-06-25 PROCEDURE — 25000132 ZZH RX MED GY IP 250 OP 250 PS 637: Performed by: OBSTETRICS & GYNECOLOGY

## 2017-06-25 PROCEDURE — 83789 MASS SPECTROMETRY QUAL/QUAN: CPT | Performed by: OBSTETRICS & GYNECOLOGY

## 2017-06-25 PROCEDURE — 76819 FETAL BIOPHYS PROFIL W/O NST: CPT

## 2017-06-25 PROCEDURE — 36415 COLL VENOUS BLD VENIPUNCTURE: CPT | Performed by: OBSTETRICS & GYNECOLOGY

## 2017-06-25 PROCEDURE — 85027 COMPLETE CBC AUTOMATED: CPT | Performed by: OBSTETRICS & GYNECOLOGY

## 2017-06-25 PROCEDURE — 86901 BLOOD TYPING SEROLOGIC RH(D): CPT | Performed by: OBSTETRICS & GYNECOLOGY

## 2017-06-25 PROCEDURE — 80076 HEPATIC FUNCTION PANEL: CPT | Performed by: OBSTETRICS & GYNECOLOGY

## 2017-06-25 PROCEDURE — 12000029 ZZH R&B OB INTERMEDIATE

## 2017-06-25 PROCEDURE — 81001 URINALYSIS AUTO W/SCOPE: CPT | Performed by: OBSTETRICS & GYNECOLOGY

## 2017-06-25 PROCEDURE — 87653 STREP B DNA AMP PROBE: CPT | Performed by: OBSTETRICS & GYNECOLOGY

## 2017-06-25 RX ORDER — DOCUSATE SODIUM 100 MG/1
100 CAPSULE, LIQUID FILLED ORAL 2 TIMES DAILY PRN
Status: DISCONTINUED | OUTPATIENT
Start: 2017-06-25 | End: 2017-07-27

## 2017-06-25 RX ORDER — ACETAMINOPHEN 325 MG/1
650 TABLET ORAL EVERY 4 HOURS PRN
Status: DISCONTINUED | OUTPATIENT
Start: 2017-06-25 | End: 2017-07-27

## 2017-06-25 RX ORDER — DIPHENHYDRAMINE HCL 25 MG
25-50 CAPSULE ORAL EVERY 6 HOURS PRN
Status: DISCONTINUED | OUTPATIENT
Start: 2017-06-25 | End: 2017-07-27

## 2017-06-25 RX ADMIN — NIFEDIPINE 20 MG: 10 CAPSULE, LIQUID FILLED ORAL at 22:33

## 2017-06-25 RX ADMIN — NIFEDIPINE 20 MG: 10 CAPSULE, LIQUID FILLED ORAL at 10:21

## 2017-06-25 RX ADMIN — NIFEDIPINE 20 MG: 10 CAPSULE, LIQUID FILLED ORAL at 16:23

## 2017-06-25 RX ADMIN — NIFEDIPINE 20 MG: 10 CAPSULE, LIQUID FILLED ORAL at 04:51

## 2017-06-25 RX ADMIN — DIPHENHYDRAMINE HYDROCHLORIDE 25 MG: 25 CAPSULE ORAL at 23:07

## 2017-06-25 RX ADMIN — ACETAMINOPHEN 650 MG: 325 TABLET, FILM COATED ORAL at 20:01

## 2017-06-25 NOTE — PROVIDER NOTIFICATION
At 0440 both baby A and baby B placed on monitors for NST. Baby A has reactive strip. At 0545 still unable to obtain reactive strip on baby B. FHT's 130's to 140's with moderate variability. Pt states she feels baby moving, fetal movements noted by RN. Notified Dr RICH Dozier at 0555. Orders received to get BPP on pt today.

## 2017-06-25 NOTE — PROVIDER NOTIFICATION
2230-applied fetal monitor to both baby A and baby B for q shift NST. Baby A captures well. FHTs 140's, moderate variability, accels noted. Baby B is difficult to trace due to position and fetal movement.     2330- baby B continues to be difficult to trace. FHT's noted to be in the 140's to 150's. Pt states that baby has been very active. RN notes fetal movent while attempting to trace. Dr DAYA Dozier notified about NST. Orders received to retry NST later in the shift.

## 2017-06-25 NOTE — PROGRESS NOTES
"Feels less crampy in the last 24 hrs, continues on nifedipine. Notes new onset itching of her hands. RN had difficulty tracing Baby B, although audibly had good variability and accels overnight. BPP this AM 8/8 x 2.   /59  Pulse 93  Temp 98.1  F (36.7  C)  Resp 16  Ht 1.676 m (5' 6\")  Wt 111.1 kg (245 lb)  LMP 11/10/2016  BMI 39.54 kg/m2   NAD  Abd: Soft, NT  Cervix: Deferred    [place]: occasional  FHT  BPP vtx/oblique, 8/8 x 2    A/P: HD#4, 36 year old  @ 32w3d with di/di twins, arrested PTL and ACD  1. Continue on nifedipine tocolysis. S/p BMZ.  2. UA on  showed large LE. Pt denies dysuria or frequency, will repeat UA today  3. Itching of hands: check LFTs, CBC and bile acids  4. GEETA: Pt desires vaginal delivery, is prepared for non-vertex second twin delivering vaginally through breech extraction  5. T&S today to keep active  6. NST q shift. BPP twice weekly (done today)  7. Will send Group B strep culture  8. Will keep inpatient due to ACD and social situation- lives 45 minutes away. Has remained stable, can consider discharge to home at 33-34 weeks if stable.  9. Anabaptism: will refuse blood products other than cell saver, even in event of death. Will sign blood product refusal form.    Ira Dozier   "

## 2017-06-25 NOTE — PLAN OF CARE
1100- Dr RICH Dozier here to assess pt- labs ordered for c/o intermittent itching of hands-GBS sent- U/A sent- BPP 8/8 x2.  1830- C/O lower back ache - heating pad applied-skin W&D without trauma- occasional uterine cramping noted .  Pt states she is not allergic to Penicillamine, but she is allergic to PCN, allergy band updated.

## 2017-06-26 LAB
GP B STREP DNA SPEC QL NAA+PROBE: NORMAL
SPECIMEN SOURCE: NORMAL

## 2017-06-26 PROCEDURE — 99233 SBSQ HOSP IP/OBS HIGH 50: CPT | Performed by: NURSE PRACTITIONER

## 2017-06-26 PROCEDURE — 25000132 ZZH RX MED GY IP 250 OP 250 PS 637: Performed by: OBSTETRICS & GYNECOLOGY

## 2017-06-26 PROCEDURE — 12000029 ZZH R&B OB INTERMEDIATE

## 2017-06-26 PROCEDURE — 99207 ZZC CONSULT E&M CHANGED TO SUBSEQUENT LEVEL: CPT | Performed by: NURSE PRACTITIONER

## 2017-06-26 RX ORDER — PRENATAL VIT/IRON FUM/FOLIC AC 27MG-0.8MG
1 TABLET ORAL DAILY
Status: DISCONTINUED | OUTPATIENT
Start: 2017-06-26 | End: 2017-07-27

## 2017-06-26 RX ADMIN — NIFEDIPINE 20 MG: 10 CAPSULE, LIQUID FILLED ORAL at 04:45

## 2017-06-26 RX ADMIN — NIFEDIPINE 20 MG: 10 CAPSULE, LIQUID FILLED ORAL at 22:30

## 2017-06-26 RX ADMIN — PRENATAL VIT W/ FE FUMARATE-FA TAB 27-0.8 MG 1 TABLET: 27-0.8 TAB at 11:35

## 2017-06-26 RX ADMIN — NIFEDIPINE 20 MG: 10 CAPSULE, LIQUID FILLED ORAL at 16:30

## 2017-06-26 RX ADMIN — NIFEDIPINE 20 MG: 10 CAPSULE, LIQUID FILLED ORAL at 11:16

## 2017-06-26 NOTE — PLAN OF CARE
Dr Sarah notified about inability to document accelerations on baby B. Accelerations audible, and fetal movement noted by both patient and myself. Plan is to do BPP twice weekly. Not necessarily focus on NST.

## 2017-06-26 NOTE — PROGRESS NOTES
Afebrile.  Occasional contractions, resting comfortably.     Exam Cx 4-5 cm, posterior.    Plan continue observation

## 2017-06-27 PROCEDURE — 12000029 ZZH R&B OB INTERMEDIATE

## 2017-06-27 PROCEDURE — 25000132 ZZH RX MED GY IP 250 OP 250 PS 637: Performed by: OBSTETRICS & GYNECOLOGY

## 2017-06-27 RX ADMIN — NIFEDIPINE 20 MG: 10 CAPSULE, LIQUID FILLED ORAL at 04:21

## 2017-06-27 RX ADMIN — NIFEDIPINE 20 MG: 10 CAPSULE, LIQUID FILLED ORAL at 16:39

## 2017-06-27 RX ADMIN — PRENATAL VIT W/ FE FUMARATE-FA TAB 27-0.8 MG 1 TABLET: 27-0.8 TAB at 09:00

## 2017-06-27 RX ADMIN — NIFEDIPINE 20 MG: 10 CAPSULE, LIQUID FILLED ORAL at 22:31

## 2017-06-27 RX ADMIN — NIFEDIPINE 20 MG: 10 CAPSULE ORAL at 16:39

## 2017-06-27 RX ADMIN — NIFEDIPINE 20 MG: 10 CAPSULE, LIQUID FILLED ORAL at 10:23

## 2017-06-27 NOTE — CONSULTS
I was asked to provide antepartum counseling for Katie Soto at the request of Linda Meraz MD because of twin gestation at 32w4d gestation. Katie Soto and Radha Soto, her spouse, were counseled on the expected hospital course, potential risks, and outcomes associated with infants born at approximately 32-33 weeks gestation.     The counseling included: initial delivery room stabilization, respiratory course, lung development, respiratory management including surfactant administration, apnea and bradycardia of prematurity, IVH, at risk for infection, nutrition including initial IV fluids and transition to gavage feedings then breast or bottle feeding, growth and development, and long term outcomes.     Please feel free to call with any additional questions or concerns.    Floor Time (min): 10  Face to Face Time (min): 30  Total Time (minutes): 40  More than 50% of my time was spent in direct, face to face, antepartum counseling with the above patient.      Bebe Zaratel, APRN, CNP, NNP

## 2017-06-27 NOTE — PLAN OF CARE
0140.  Pt sleeping, awoken when RN into room, pt states that she felt like she was having contractions while she was sleeping.  Pt up to void, will readjust toco, as no frequent contractions noted with external monitor.   Pt reminded to call and let RN know if she isnt feeling well or if she is unable to rest due to contractions.

## 2017-06-27 NOTE — PROGRESS NOTES
"2017      S: No acute overnight events.  Pt denies any cramping/UC, VB, or LOF.  Reports +FM x 2.  Denies any increased pressure or discomfort.        O: /57  Pulse 89  Temp 98.1  F (36.7  C) (Temporal)  Resp 16  Ht 1.676 m (5' 6\")  Wt 111.1 kg (245 lb)  LMP 11/10/2016  BMI 39.54 kg/m2  Gen: NAD, A&O x 3  Abd: Gravid, NT  Ext: mild edema BL LEs, symmetric, no CT  SVE: deferred  FHT: A 140, mod variability, +accels, B: 140, difficulty keeping B on monitor, overall mod variability  TOCO: irregular UCs with irritability      A/P: 37yo  @ 32.5 wga with di-di twins, HD#6 admitted for arrested PTL, advanced cervical dilation. Afebrile, VSS.    1. Arrested PTL/ACD:  - s/p BMZ, s/p MgSO4.    - Continue nifedipine for maintenance tocolysis.      2. Maternal status: Stable.   - Regular diet  - Of note, pt is [employer] and has signed blood product refusal form  - GBS negative    3. Fetal statuses: Overall reassuring x 2.    - Continue NSTs Qshift.  BPP 2x/wk.    - Last growth US 6/15: A 61% 1768g, B 58% 1731g.   - S/p BMZ for FLM.    4. Dispo: continue inpatient management      NAKIA ARTEAGA MD      "

## 2017-06-27 NOTE — PLAN OF CARE
"Problem: Goal Outcome Summary  Goal: Goal Outcome Summary  Outcome: Therapy, progress toward functional goals as expected  Multip, twins, 32 5/7 gestation, here on bedrest with advanced dilatation.   Pt notes occasional uterine contraction but nothing regular.  Pt denies vaginal bleeding or leaking or fluid.  Pt tolerating every 6 hours, Nifedipine orally.  Fetal monitoring reassuring, no reactive strip on Baby B, Dr.Ou Burger in department, reviewed fetal monitoring from 0503-5994, she states that the fetal monitoring is acceptable, \"reassuring\".   Baby B is active, pt feels movement frequently.    NNPRafaela, in to see pt and  this evening regarding expectations of twins at current gestation and further.        "

## 2017-06-27 NOTE — CONSULTS
"BRIEF NUTRITION ASSESSMENT      REASON FOR ASSESSMENT:  RN consult - \"Patient is pregnant with twins on bedrest. Long term hospitalization\"    CURRENT DIET AND INTAKE:  Diet:  Regular              Pt is ordering full meals      ANTHROPOMETRICS:  Height: 5'6\"    Vitals:    06/22/17 1209   Weight: 111.1 kg (245 lb)       LABS:  Labs noted    MALNUTRITION:  Patient does not meet two of the following criteria necessary for diagnosing malnutrition: significant weight loss, reduced intake, subcutaneous fat loss, muscle loss or fluid retention    NUTRITION INTERVENTION:  Nutrition Diagnosis:  No nutrition diagnosis at this time.    Implementation:  Nutrition Education ---> Per Provider order if indicated  Provided pt with copy of the [employer] menu for additional meal options.  She will receive a new menu every Monday.    FOLLOW UP/MONITORING:   Will re-evaluate in 7 - 10 days, or sooner, if re-consulted.          "

## 2017-06-27 NOTE — PLAN OF CARE
Problem: Goal Outcome Summary  Goal: Goal Outcome Summary  Outcome: No Change  Pt awake at 0400, states she can't get comfortable and fall back to sleep. Offered to monitor babies at that time since she was awake, pt agreed. Monitors applied, difficulty getting twin B to stay on the monitor. RN continuously at bedside adjusting monitor, one 7 minute segment with moderate variability and one 12 minute segment with minimal variability traced between 0400 and 0510, no accels or decels noted. Pt loki occasionally with irritability, states she feels crampy at times but denies pain, bleeding, or leaking of fluid. Scheduled nifedipine given at 0430.

## 2017-06-28 ENCOUNTER — APPOINTMENT (OUTPATIENT)
Dept: ULTRASOUND IMAGING | Facility: CLINIC | Age: 37
End: 2017-06-28
Attending: OBSTETRICS & GYNECOLOGY
Payer: COMMERCIAL

## 2017-06-28 PROCEDURE — 12000029 ZZH R&B OB INTERMEDIATE

## 2017-06-28 PROCEDURE — 25000128 H RX IP 250 OP 636: Performed by: OBSTETRICS & GYNECOLOGY

## 2017-06-28 PROCEDURE — 25000132 ZZH RX MED GY IP 250 OP 250 PS 637: Performed by: OBSTETRICS & GYNECOLOGY

## 2017-06-28 PROCEDURE — 86780 TREPONEMA PALLIDUM: CPT | Performed by: OBSTETRICS & GYNECOLOGY

## 2017-06-28 PROCEDURE — 76819 FETAL BIOPHYS PROFIL W/O NST: CPT

## 2017-06-28 PROCEDURE — 36415 COLL VENOUS BLD VENIPUNCTURE: CPT | Performed by: OBSTETRICS & GYNECOLOGY

## 2017-06-28 RX ORDER — OXYTOCIN/0.9 % SODIUM CHLORIDE 30/500 ML
100-340 PLASTIC BAG, INJECTION (ML) INTRAVENOUS CONTINUOUS PRN
Status: COMPLETED | OUTPATIENT
Start: 2017-06-28 | End: 2017-07-27

## 2017-06-28 RX ORDER — ONDANSETRON 2 MG/ML
4 INJECTION INTRAMUSCULAR; INTRAVENOUS EVERY 6 HOURS PRN
Status: DISCONTINUED | OUTPATIENT
Start: 2017-06-28 | End: 2017-07-27

## 2017-06-28 RX ORDER — CARBOPROST TROMETHAMINE 250 UG/ML
250 INJECTION, SOLUTION INTRAMUSCULAR
Status: DISCONTINUED | OUTPATIENT
Start: 2017-06-28 | End: 2017-07-27

## 2017-06-28 RX ORDER — NALOXONE HYDROCHLORIDE 0.4 MG/ML
.1-.4 INJECTION, SOLUTION INTRAMUSCULAR; INTRAVENOUS; SUBCUTANEOUS
Status: DISCONTINUED | OUTPATIENT
Start: 2017-06-28 | End: 2017-07-27

## 2017-06-28 RX ORDER — OXYTOCIN 10 [USP'U]/ML
10 INJECTION, SOLUTION INTRAMUSCULAR; INTRAVENOUS
Status: DISCONTINUED | OUTPATIENT
Start: 2017-06-28 | End: 2017-07-27

## 2017-06-28 RX ORDER — SODIUM CHLORIDE, SODIUM LACTATE, POTASSIUM CHLORIDE, CALCIUM CHLORIDE 600; 310; 30; 20 MG/100ML; MG/100ML; MG/100ML; MG/100ML
INJECTION, SOLUTION INTRAVENOUS CONTINUOUS
Status: DISCONTINUED | OUTPATIENT
Start: 2017-06-28 | End: 2017-07-27

## 2017-06-28 RX ORDER — ACETAMINOPHEN 325 MG/1
650 TABLET ORAL EVERY 4 HOURS PRN
Status: DISCONTINUED | OUTPATIENT
Start: 2017-06-28 | End: 2017-07-27

## 2017-06-28 RX ORDER — IBUPROFEN 800 MG/1
800 TABLET, FILM COATED ORAL
Status: DISCONTINUED | OUTPATIENT
Start: 2017-06-28 | End: 2017-07-27

## 2017-06-28 RX ORDER — METHYLERGONOVINE MALEATE 0.2 MG/ML
200 INJECTION INTRAVENOUS
Status: DISCONTINUED | OUTPATIENT
Start: 2017-06-28 | End: 2017-07-27

## 2017-06-28 RX ORDER — ALBUTEROL SULFATE 90 UG/1
2 AEROSOL, METERED RESPIRATORY (INHALATION)
COMMUNITY
Start: 2016-05-13

## 2017-06-28 RX ADMIN — NIFEDIPINE 20 MG: 10 CAPSULE, LIQUID FILLED ORAL at 10:27

## 2017-06-28 RX ADMIN — SODIUM CHLORIDE, POTASSIUM CHLORIDE, SODIUM LACTATE AND CALCIUM CHLORIDE 1000 ML: 600; 310; 30; 20 INJECTION, SOLUTION INTRAVENOUS at 17:30

## 2017-06-28 RX ADMIN — SODIUM CHLORIDE, POTASSIUM CHLORIDE, SODIUM LACTATE AND CALCIUM CHLORIDE: 600; 310; 30; 20 INJECTION, SOLUTION INTRAVENOUS at 18:07

## 2017-06-28 RX ADMIN — PRENATAL VIT W/ FE FUMARATE-FA TAB 27-0.8 MG 1 TABLET: 27-0.8 TAB at 07:47

## 2017-06-28 RX ADMIN — NIFEDIPINE 20 MG: 10 CAPSULE, LIQUID FILLED ORAL at 04:27

## 2017-06-28 RX ADMIN — NIFEDIPINE 20 MG: 10 CAPSULE, LIQUID FILLED ORAL at 16:26

## 2017-06-28 RX ADMIN — NIFEDIPINE 20 MG: 10 CAPSULE, LIQUID FILLED ORAL at 22:26

## 2017-06-28 NOTE — PLAN OF CARE
Problem:  Labor (Adult,Obstetrics,Pediatric)  Goal: Signs and Symptoms of Listed Potential Problems Will be Absent or Manageable ( Labor)  Signs and symptoms of listed potential problems will be absent or manageable by discharge/transition of care (reference  Labor (Adult,Obstetrics,Pediatric) CPG).   Outcome: Improving  VSS. Pt denies pain but feeling some cramping intermittently when up to the bathroom. [place] picking up intermittent contractions. NST done for evening shift- difficult to keep baby B on monitor. Both babies reactive. Will continue to monitor and notify MD with any concerns.

## 2017-06-28 NOTE — PLAN OF CARE
FHT for infant A is category 1.  RN tried to trace infant B for 50 minutes by patient bedside.  Able to trace 5 minutes.  RN heard infant hiccups and saw/felt/heard infant movement.  BPP this am was 8/8 for both infants.

## 2017-06-28 NOTE — PROGRESS NOTES
CTSP for possible cx change  Feeling more ctx than earlier today  abd- NT  cx- 5/80/-2 VTX  No REID pressure  Imp- No active labor  Plan- continue nifedepine

## 2017-06-28 NOTE — PLAN OF CARE
2315: Report received from Sofy STAPLETON will assume all cares from this time.  0015: Pt woken by RN. VSS. Patient denies bleeding, LOF, feeling any cramps or UTCs. Patient assessed. Will continue with plan of care. Patient encouraged to call if any s/s of  labor.  From [id number] toco showing 5 UTCs. Patient sleeping through all UTCs.  From 4824-4770 toco showing 5 UTCs. Patient sleeping through UTCs. Patient woken by RN ad encouraged to void.  0445: Baby A and Baby B placed on external monitors.  0512: VSS, afebrile. Baby A removed from monitor.  with accels, no decels. Baby B removed from external monitor.  with accels, no decels. [place] shows irritability. Patient denies any pain, cramping.  0647: Patient off monitor for BPP.  0715: SBAR report given to Aleha MORA RN to assume all cares from this time.

## 2017-06-28 NOTE — PLAN OF CARE
0745: Pt returned from US after BPP.    0815: Pt up to eat breakfast.    900: Dr. Ricks stopped in to see Pt and discuss POC. MD aware T&S expires at midnight and that blood refusal form is signed on the chart.    0930: Pt up to shower.    1030: Nifedipine given. [place] Monitor reapplied.    11:10: Report to Rosalinda Turk RN

## 2017-06-28 NOTE — PROGRESS NOTES
"S: No acute overnight events.  Pt denies any cramping/UC, VB, or LOF.  Reports +FM x 2.  Denies any increased pressure or discomfort.          O: /57  Pulse 89  Temp 98.1  F (36.7  C) (Temporal)  Resp 16  Ht 1.676 m (5' 6\")  Wt 111.1 kg (245 lb)  LMP 11/10/2016  BMI 39.54 kg/m2  Gen: NAD, A&O x 3  Abd: Gravid, NT  Ext: mild edema BL LEs, symmetric, no CT  SVE: deferred  FHT: A 140, mod variability, +accels, B: 140, difficulty keeping B on monitor, overall mod variability  TOCO: irregular UCs with irritability  BPP 8/8 x2        A/P: 37yo  @ 32 +6 wga with di-di twins, HD#6 admitted for arrested PTL, advanced cervical dilation. Afebrile, VSS.     1. Arrested PTL/ACD:  - s/p BMZ, s/p MgSO4.    - Continue nifedipine for maintenance tocolysis.       2. Maternal status: Stable.   - Regular diet  - Of note, pt is [employer] and has signed blood product refusal form  - GBS negative     3. Fetal statuses: Overall reassuring x 2.    - Continue NSTs Qshift.  BPP 2x/wk.  8/8 x2 today  - Last growth US 6/15: A 61% 1768g, B 58% 1731g.   - S/p BMZ for FLM.     4. Dispo: continue inpatient management  "

## 2017-06-29 LAB — T PALLIDUM IGG+IGM SER QL: NEGATIVE

## 2017-06-29 PROCEDURE — 25000128 H RX IP 250 OP 636: Performed by: OBSTETRICS & GYNECOLOGY

## 2017-06-29 PROCEDURE — 25000132 ZZH RX MED GY IP 250 OP 250 PS 637: Performed by: OBSTETRICS & GYNECOLOGY

## 2017-06-29 PROCEDURE — 12000029 ZZH R&B OB INTERMEDIATE

## 2017-06-29 RX ORDER — CALCIUM CARBONATE 500 MG/1
1000 TABLET, CHEWABLE ORAL 3 TIMES DAILY PRN
Status: DISCONTINUED | OUTPATIENT
Start: 2017-06-29 | End: 2017-07-27

## 2017-06-29 RX ADMIN — CALCIUM CARBONATE (ANTACID) CHEW TAB 500 MG 1000 MG: 500 CHEW TAB at 18:16

## 2017-06-29 RX ADMIN — SODIUM CHLORIDE, POTASSIUM CHLORIDE, SODIUM LACTATE AND CALCIUM CHLORIDE: 600; 310; 30; 20 INJECTION, SOLUTION INTRAVENOUS at 02:11

## 2017-06-29 RX ADMIN — NIFEDIPINE 20 MG: 10 CAPSULE, LIQUID FILLED ORAL at 04:30

## 2017-06-29 RX ADMIN — NIFEDIPINE 20 MG: 10 CAPSULE, LIQUID FILLED ORAL at 16:31

## 2017-06-29 RX ADMIN — PRENATAL VIT W/ FE FUMARATE-FA TAB 27-0.8 MG 1 TABLET: 27-0.8 TAB at 09:10

## 2017-06-29 RX ADMIN — DIPHENHYDRAMINE HYDROCHLORIDE 25 MG: 25 CAPSULE ORAL at 22:28

## 2017-06-29 RX ADMIN — SODIUM CHLORIDE, POTASSIUM CHLORIDE, SODIUM LACTATE AND CALCIUM CHLORIDE: 600; 310; 30; 20 INJECTION, SOLUTION INTRAVENOUS at 09:48

## 2017-06-29 RX ADMIN — NIFEDIPINE 20 MG: 10 CAPSULE, LIQUID FILLED ORAL at 22:28

## 2017-06-29 RX ADMIN — NIFEDIPINE 20 MG: 10 CAPSULE, LIQUID FILLED ORAL at 10:27

## 2017-06-29 NOTE — PROGRESS NOTES
2025 Dr Meraz paged and updated on contractions and SVE.  Order to monitor Babies Q shift with continuous Excel, call and update Dr Meraz if patient status changes or if contractions get stronger and patient uncomfortable.  2130 patient loki Q 1-5 minutes, contractions palpate mild.  Patient states they are mild and not getting stronger.  2230  Patient states contractions are mild and not getting stronger.  Patient instructed to inform RN if contractions get stronger.  Nifedapine given.  2330 patient sleeping and resting comfortably no complaints.  0130 patient sleeping no complaints.  0430 nifedipine given.  Patient reports no change in contraction strength or frequency. Report given to Rosalinda GARCIA RN

## 2017-06-29 NOTE — PLAN OF CARE
Problem:  Labor (Adult,Obstetrics,Pediatric)  Goal: Signs and Symptoms of Listed Potential Problems Will be Absent or Manageable ( Labor)  Signs and symptoms of listed potential problems will be absent or manageable by discharge/transition of care (reference  Labor (Adult,Obstetrics,Pediatric) CPG).   Outcome: No Change  715-Taking over care of pt. Report received from Digna ROGERS RN. Dr Meraz at bedside discussing plan of care. Pt has had uterine cxns and irritability throughout the night, denies any change in intensity. +FM x2. FHTs reactive, however baby B is difficult to find. Denies bleeding or ROM. Sleeping well in between cares and cxns. Feels rested and states she is coping well with being inpatient on bedrest.   1300-Dr Meraz at bedside discussing POC.  1800-Dr Meraz updated. Unable to get NST on baby B. Felt multiple movements while attempting to monitor for >60 mins.  -Report given to LAVINIA Aldana

## 2017-06-29 NOTE — PROGRESS NOTES
"  Antepartum Progress Note:    S: pt is comfortable. Had episode of cramping last evening, which she reports as \"typical\" for her evenings. She continues on Nifedipine. Good fetal movement.     O:  /65  Pulse 89  Temp 97.9  F (36.6  C) (Temporal)  Resp 18  Ht 1.676 m (5' 6\")  Wt 111.1 kg (245 lb)  LMP 11/10/2016  BMI 39.54 kg/m2  SVE: (Last evening) 5/80/-2  TOCO: (last strip) irritable  FHR: (last strip) Cat 1    A/P; 37 yo  at 33+0/7 wks. With di/di twins with advanced cervical dilation.   1. S/P BMZ and Magnesium sulfate. If labor ensues may consider rescue dosage of BMZ.   2. Jehovah Witness. When in delivery consider cell saver available if conversion to C/S as she declines blood transfusion even in event of life saving maneuvers.   3. Continue Nifedapine.   4. Will discuss plan of care with MFM should pt make it to 34 weeks with advanced dilation and living greater than 45 minutes from hospital.     Linda Meraz        "

## 2017-06-29 NOTE — PLAN OF CARE
"Problem:  Labor (Adult,Obstetrics,Pediatric)  Goal: Signs and Symptoms of Listed Potential Problems Will be Absent or Manageable ( Labor)  Signs and symptoms of listed potential problems will be absent or manageable by discharge/transition of care (reference  Labor (Adult,Obstetrics,Pediatric) CPG).   Outcome: No Change  At 1600 Rn called to room and patient reported feeling stronger contractions and feeling \"off\".  RN called Dr. Meraz at 1640 and told RN do SVE.  SVE RN said /.  Dr. Meraz notified and intrapartum orders placed.  IV started and fluid bolus given of 1000 ml to slow labor.  Patient at 1800 said contractions are slowing down yet had one that took her breath away.  Complaining of back pain.  Dr. Ricks in room at 1830 to do ultrasound and SVE.  SVE per Dr. Ricks /-.  Infant 2 is very difficult to trace so Dr. Ricks did Ultrasound to find HR.  HR WDL.  RN will continue to try to trace infant 2.  Infant 2 is active.        "

## 2017-06-29 NOTE — PLAN OF CARE
Problem: Goal Outcome Summary  Goal: Goal Outcome Summary  Outcome: No Change  See progress note

## 2017-06-29 NOTE — PROGRESS NOTES
[facility] Discussion:    DIscussed with Dr. Acosta at [facility] today. Plan for Nifedapine until 34+0/7 wks. Continue at inpatient due to proximity from hospital and advanced cervical dilation until delivery. If able, prior to 34 week delivery could consider redosage of BMZ.     Linda Meraz

## 2017-06-30 PROCEDURE — 12000029 ZZH R&B OB INTERMEDIATE

## 2017-06-30 PROCEDURE — 25000132 ZZH RX MED GY IP 250 OP 250 PS 637: Performed by: OBSTETRICS & GYNECOLOGY

## 2017-06-30 RX ADMIN — NIFEDIPINE 20 MG: 10 CAPSULE, LIQUID FILLED ORAL at 16:56

## 2017-06-30 RX ADMIN — NIFEDIPINE 20 MG: 10 CAPSULE, LIQUID FILLED ORAL at 22:41

## 2017-06-30 RX ADMIN — DIPHENHYDRAMINE HYDROCHLORIDE 25 MG: 25 CAPSULE ORAL at 00:21

## 2017-06-30 RX ADMIN — NIFEDIPINE 20 MG: 10 CAPSULE, LIQUID FILLED ORAL at 05:48

## 2017-06-30 RX ADMIN — NIFEDIPINE 20 MG: 10 CAPSULE, LIQUID FILLED ORAL at 10:40

## 2017-06-30 RX ADMIN — PRENATAL VIT W/ FE FUMARATE-FA TAB 27-0.8 MG 1 TABLET: 27-0.8 TAB at 08:06

## 2017-06-30 NOTE — PROVIDER NOTIFICATION
MD updated regarding removal of IV SL.  MD stated that pt does not need a new IV access at this time.

## 2017-06-30 NOTE — PROGRESS NOTES
HD8    35 yo  at 33+1 with twins and PTL/advanced cervical dilation on Nifedipine    AFVSS  Java occ  AM NST pending  Cx check deferred    Plan MFM consult for growth  Discussed the pros and cons of full breech extraction should baby B not convert to vertex during delivery of twin A

## 2017-06-30 NOTE — PLAN OF CARE
Problem:  Labor (Adult,Obstetrics,Pediatric)  Goal: Signs and Symptoms of Listed Potential Problems Will be Absent or Manageable ( Labor)  Signs and symptoms of listed potential problems will be absent or manageable by discharge/transition of care (reference  Labor (Adult,Obstetrics,Pediatric) CPG).   Outcome: Therapy, progress toward functional goals as expected  Occasionally loki.  Doing well on Nifedipine.  FHT's cat 1 tracing.

## 2017-06-30 NOTE — PLAN OF CARE
Pt c/o discomfort at IV insertion site and up her arm about 4 inches.  No redness or swelling.  Nontender to palpation.  IV SL removed with cannula intact.

## 2017-06-30 NOTE — PROVIDER NOTIFICATION
"[facility] clinic called and stated they are \"closed\" today and don't have an ultrasound staff available today.  Scheduled for Monday at 1145.  MD updated per text page.  "

## 2017-06-30 NOTE — PLAN OF CARE
NST done at 2323.  Strip reviewed by Dr. Meraz.  Patient slept comfortably overnight.  Report given to Stacie LAWRENCE RN.

## 2017-07-01 PROCEDURE — 25000132 ZZH RX MED GY IP 250 OP 250 PS 637: Performed by: OBSTETRICS & GYNECOLOGY

## 2017-07-01 PROCEDURE — 12000029 ZZH R&B OB INTERMEDIATE

## 2017-07-01 RX ADMIN — PRENATAL VIT W/ FE FUMARATE-FA TAB 27-0.8 MG 1 TABLET: 27-0.8 TAB at 08:14

## 2017-07-01 RX ADMIN — NIFEDIPINE 20 MG: 10 CAPSULE, LIQUID FILLED ORAL at 16:42

## 2017-07-01 RX ADMIN — NIFEDIPINE 20 MG: 10 CAPSULE, LIQUID FILLED ORAL at 22:31

## 2017-07-01 RX ADMIN — NIFEDIPINE 20 MG: 10 CAPSULE, LIQUID FILLED ORAL at 11:06

## 2017-07-01 RX ADMIN — NIFEDIPINE 20 MG: 10 CAPSULE, LIQUID FILLED ORAL at 04:31

## 2017-07-01 RX ADMIN — CALCIUM CARBONATE (ANTACID) CHEW TAB 500 MG 1000 MG: 500 CHEW TAB at 20:45

## 2017-07-01 NOTE — PLAN OF CARE
1910 pt denies feeling ctxs/vag bleeding/LOF.  Pt states she will tell RN if that changes.  2118 monitors applied, baby B difficult to monitor DT fetal movement/position. RN at bedside adjusting/holding monitor entire time. 2235 u/s monitors reapplied, RN at bedside entire time holding/adjusting monitors.  Baby B difficult to monitor DT fetal movement/position.  0535 u/s monitors applied.  RN at bedside adjusting/holding monitor.  Baby b difficult to monitor dt fetal movement/position.   9557 Dr Wood in department, reviewed fht from 9750-8707 and pt ctxs.  MD ok with what RN was able to trace.  MD to bedside.  1107 report to Stacie DE LA PAZ RN to assume care at this time.

## 2017-07-01 NOTE — PLAN OF CARE
5276-1207:  This RN sat at bedside continuously holding monitor for NST.  Baby A easy to monitor.  Baby B unable to keep on monitor for longer than 2--10 sec snipets, finding fhr 145-155.  Had captured 85 sec at 165 initially than extremely difficult time to monitor.  RN attempting to monitor over 1 hr.  Both babies moving.  Majority of time only picking up Baby A's hr.  4700-3547:  Another RN in room per request and was able to find more consistant fht on Baby B and was able to keep consecutive fht's on both babies for 12 minutes.   1500:  Monitors removed.  Pt up to bathroom to shower.  Linen changed.

## 2017-07-01 NOTE — PROGRESS NOTES
"2017    S: No acute overnight events.  Pt was feeling some cramping earlier this AM, but resolved after urination.  Occasionally feels UC, 2/hour.  Denies VB/LOF. +FM x 2.  Denies increased pelvic pressure.      O: /70  Pulse 89  Temp 97.5  F (36.4  C) (Temporal)  Resp 16  Ht 1.676 m (5' 6\")  Wt 111.1 kg (245 lb)  LMP 11/10/2016  BMI 39.54 kg/m2  Gen: NAD, A&O x 3  Abd: Gravid, NT  SVE: deferred  FHT: A/B 145, mod variability, no accels, no decels  TOCO: Occasional/irregular      A/P: 35yo  @ 33.2 wga, di-di twin gestation, HD#9 admitted for (arrested) PTL, advanced cervical dilation.  Afebrile, VSS.    1. Arrested PTL/ACD:  - s/p BMZ, s/p MgSO4  - continue nifedipine for maintenance tocolysis until 34 wga per MFM.  Appreciate recommendations.  - pt is GBS neg    2. Maternal status: stable  - Regular diet as tolerated  - Confucianist - declines blood products/transfusion, but is agreeable to cell saver if needed; cell saver phone number [phone]. 30 min  from time of order to delivery.  - SCDs when in bed    3. Fetal statuses: overall reassuring x 2  - NSTs Qshift. Twin B requires handholding of monitor due to FM.    - Continue BPP 2x/wk (last one 17, 8/8 x 2)  - Growth US 6/15 A EFW 61% 1768g, B 58% 1731 g.    - s/p BMZ for FLM  - Consider repeat BMZ course closer to 34 wga if labor    4. Dispo: continue inpatient mgmt until delivery due to ACD, and also pt lives 45 min from hospital.      NAKIA ARTEAGA MD    "

## 2017-07-02 PROCEDURE — 12000029 ZZH R&B OB INTERMEDIATE

## 2017-07-02 PROCEDURE — 25000132 ZZH RX MED GY IP 250 OP 250 PS 637: Performed by: OBSTETRICS & GYNECOLOGY

## 2017-07-02 RX ADMIN — NIFEDIPINE 20 MG: 10 CAPSULE, LIQUID FILLED ORAL at 16:31

## 2017-07-02 RX ADMIN — NIFEDIPINE 20 MG: 10 CAPSULE, LIQUID FILLED ORAL at 22:34

## 2017-07-02 RX ADMIN — PRENATAL VIT W/ FE FUMARATE-FA TAB 27-0.8 MG 1 TABLET: 27-0.8 TAB at 07:59

## 2017-07-02 RX ADMIN — NIFEDIPINE 20 MG: 10 CAPSULE, LIQUID FILLED ORAL at 10:27

## 2017-07-02 RX ADMIN — NIFEDIPINE 20 MG: 10 CAPSULE, LIQUID FILLED ORAL at 04:29

## 2017-07-02 NOTE — PROGRESS NOTES
"2017      S: No acute overnight events.  Pt denies any regular UC/VB/LOF. +FM x 2.  Does report mildly increased pressure, mainly when walking/going to bathroom, compared to a couple days ago but currently comfortable.    O: /57  Pulse 89  Temp 98.4  F (36.9  C) (Temporal)  Resp 16  Ht 1.676 m (5' 6\")  Wt 111.1 kg (245 lb)  LMP 11/10/2016  BMI 39.54 kg/m2  Gen: NAD, A&O x 3  Abd: Gravid, NT  SVE: deferred  FHT: A/B 135, mod variability, A with +accels, B no accels, no decels, Cat I  TOCO: occasional, <3/hour      A/P: 35yo  @ 33.3 wga di-di twin gestation, HD#10 admitted for PTL, advanced cervical dilation.  AVSS.    1. Arrested PTL/ACD:  - s/p BMZ, s/p MgSO4  - continue nifedipine for maintenance tocolysis until 34 wga per [facility].  Appreciate recs.    - GBS neg    2. Maternal Status: stable  - regular diet as tolerated  - SCDs when in bed  - Pentecostalism - declines blood products but ok with cell saver (phone [phone]).    3. Fetal Statuses: overall reassuring x 2  - NSTs Qshift.  BPP 2x/wk.  Pt has US with M tomorrow @ 11:45 AM (growth, BPP)  - last growth US 6/15: A 61% 1768g, B58% 1731g  - s/p BMZ for FLM  - per [facility], consider repeat course of BMZ closer to 34 wga if in labor    4. Dispo: continue inpatient management until delivery due to ACD; pt also lives 45min from hospital.      NAKIA ARTEAGA MD      "

## 2017-07-02 NOTE — PLAN OF CARE
Slept comfortably overnight. Feels occasional cramping with contractions. Denies vaginal bleeding or leaking of fluid. Positive fetal movement x2. Reminded to notify RN if anything changes. NST at 2255 and 0650, baby B held on d/t positioning.

## 2017-07-02 NOTE — PLAN OF CARE
Pt stated that she slept well last night.  Still feels occas contraction, but denies that they are stronger or closer together.  Stated feeling more perineal pressure whenever she is standing, but no other symptoms.  Denies vaginal spotting, vaginal discharge, or ROM.

## 2017-07-02 NOTE — PLAN OF CARE
Problem:  Labor (Adult,Obstetrics,Pediatric)  Goal: Signs and Symptoms of Listed Potential Problems Will be Absent or Manageable ( Labor)  Signs and symptoms of listed potential problems will be absent or manageable by discharge/transition of care (reference  Labor (Adult,Obstetrics,Pediatric) CPG).   Outcome: Therapy, progress toward functional goals as expected  Pt cont to have occas contraction, but denies increase in strength or frequency.  Pt rests majority of day in bed or sitting in chair.  Up in room and to bathroom.  Pt allowed to shower once a day; now is allowed to take one wheelchair ride per day outside of room.  Pt happy with POC.

## 2017-07-03 ENCOUNTER — HOSPITAL ENCOUNTER (INPATIENT)
Dept: ULTRASOUND IMAGING | Facility: CLINIC | Age: 37
End: 2017-07-03
Attending: OBSTETRICS & GYNECOLOGY
Payer: COMMERCIAL

## 2017-07-03 PROCEDURE — 76819 FETAL BIOPHYS PROFIL W/O NST: CPT | Performed by: OBSTETRICS & GYNECOLOGY

## 2017-07-03 PROCEDURE — 25000132 ZZH RX MED GY IP 250 OP 250 PS 637: Performed by: OBSTETRICS & GYNECOLOGY

## 2017-07-03 PROCEDURE — 76816 OB US FOLLOW-UP PER FETUS: CPT | Mod: 59

## 2017-07-03 PROCEDURE — 12000029 ZZH R&B OB INTERMEDIATE

## 2017-07-03 RX ADMIN — NIFEDIPINE 20 MG: 10 CAPSULE, LIQUID FILLED ORAL at 10:37

## 2017-07-03 RX ADMIN — PRENATAL VIT W/ FE FUMARATE-FA TAB 27-0.8 MG 1 TABLET: 27-0.8 TAB at 08:28

## 2017-07-03 RX ADMIN — NIFEDIPINE 20 MG: 10 CAPSULE, LIQUID FILLED ORAL at 04:32

## 2017-07-03 RX ADMIN — NIFEDIPINE 20 MG: 10 CAPSULE, LIQUID FILLED ORAL at 22:25

## 2017-07-03 RX ADMIN — NIFEDIPINE 20 MG: 10 CAPSULE, LIQUID FILLED ORAL at 16:33

## 2017-07-03 NOTE — PLAN OF CARE
Slept well overnight. Continuous to have occasional contractions, denies increase in intensity. Denies vaginal bleeding or leaking of fluid. Reports positive fetal movement x2. Reactive NST at 2210, RN has to hold US monitor on baby B through entire NST d/t fetal position. NST at 0655 was non-reactive for baby B, RN sat at bedside holding US monitor on baby d/t fetal position and movement, no accels observed. Patient requesting to shower, so will monitor again after shower.

## 2017-07-03 NOTE — PROGRESS NOTES
Patient reports increased back pain    [place] irritability  NST reactive x 2  80/4-5/-2    No cervical change. Continue current management with bedrest and Nifedipine

## 2017-07-03 NOTE — PROGRESS NOTES
"S: No acute overnight events.  Pt denies any regular UC/VB/LOF. +FM x 2.  Does report mildly increased pressure, mainly when walking/going to bathroom, compared to a couple days ago but currently comfortable.     O: /58  Pulse 89  Temp 98.1  F (36.7  C) (Temporal)  Resp 16  Ht 1.676 m (5' 6\")  Wt 111.1 kg (245 lb)  LMP 11/10/2016  BMI 39.54 kg/m2  Gen: NAD, A&O x 3  Abd: Gravid, NT  SVE: deferred  FHT:  Cat I  TOCO: baseline irritability        A/P: 35yo  @ 33+4/7 wga di-di twin gestation, HD#11 admitted for PTL, advanced cervical dilation.  AVSS.     1. Arrested PTL/ACD:  - s/p BMZ, s/p MgSO4  - continue nifedipine for maintenance tocolysis until 34 wga per MFM.  Appreciate recs.    - GBS neg     2. Maternal Status: stable  - regular diet as tolerated  - SCDs when in bed  - Rastafari - declines blood products but ok with cell saver (phone [phone]).     3. Fetal Statuses: overall reassuring x 2  - NSTs Qshift.  BPP 2x/wk.  Pt has US with MFM @ 11:45 AM (growth, BPP)  - last growth US 6/15: A 61% 1768g, B58% 1731g  - s/p BMZ for FLM  - per MFM, consider repeat course of BMZ closer to 34 wga if in labor     4. Dispo: continue inpatient management until delivery due to ACD; pt also lives 45min from hospital.     Linda Meraz                                "

## 2017-07-03 NOTE — PLAN OF CARE
"0720 - Report received from MARTIN Calderon RN. Will assume care of patient at this time.   0900 - Dr. Godfrey at bedside. Patient and  have list of questions, that she answered. POC discussed.   0942 - External US applied at this time. RN remained at bedside holding baby A & B on to get reactive strip.   1230 - Patient experiencing more back discomfort/pain. Heat pack given to see if it helps with discomfort.   1451 - Patient continues to have back pain, states it is different than the last couple days. Dr. Meraz paged and on the phone, updated on having 4 contractions in a hour and having a lot of irritability and increased back pain.  Plan to have Dr. Dozier check cervix when she comes to hospital.   1524 - Dr. TUSHAR Dozier at bedside. No cervical change.   1630 - Patient states back pain is \"getting better.\"   1800 - Patient continues to be uncomfortable and states she is tired.   1803 - External US applied at this time  1842 - Reactive strips noted for baby A & B. Both babies active and moving. Patient also reports positive fetal movement. Patient denies vaginal bleeding or leaking of fluid.   1915 - Report given to Digna KATE, who will assume care at this time.   "

## 2017-07-03 NOTE — PLAN OF CARE
0552-1706:  RN sat at bedside to continuously hold monitor on Baby B for 20 min to obtain NST.  Baby A staying on monitor for NST with use of monitor belts.

## 2017-07-04 PROCEDURE — 12000029 ZZH R&B OB INTERMEDIATE

## 2017-07-04 PROCEDURE — 25000132 ZZH RX MED GY IP 250 OP 250 PS 637: Performed by: OBSTETRICS & GYNECOLOGY

## 2017-07-04 RX ADMIN — NIFEDIPINE 20 MG: 10 CAPSULE, LIQUID FILLED ORAL at 16:20

## 2017-07-04 RX ADMIN — NIFEDIPINE 20 MG: 10 CAPSULE, LIQUID FILLED ORAL at 10:18

## 2017-07-04 RX ADMIN — NIFEDIPINE 20 MG: 10 CAPSULE, LIQUID FILLED ORAL at 22:55

## 2017-07-04 RX ADMIN — PRENATAL VIT W/ FE FUMARATE-FA TAB 27-0.8 MG 1 TABLET: 27-0.8 TAB at 07:57

## 2017-07-04 RX ADMIN — NIFEDIPINE 20 MG: 10 CAPSULE, LIQUID FILLED ORAL at 04:45

## 2017-07-04 NOTE — PLAN OF CARE
Problem: Goal Outcome Summary  Goal: Goal Outcome Summary  Outcome: No Change  Patient slept comfortably throughout the night, no complaints.   EFM Monitors placed at 0614,  Reactive NST on both babies noted.  Audibly heard accelerations, loss of caption noted.  Babies very active this AM.  VSS.  Patient denies leaking fluid and bleeding.  Patient independent with self cares.  Plan of care reviewed with patient, understanding verbalized.  Continue with plan of care.

## 2017-07-05 PROCEDURE — 25000132 ZZH RX MED GY IP 250 OP 250 PS 637: Performed by: OBSTETRICS & GYNECOLOGY

## 2017-07-05 PROCEDURE — 12000029 ZZH R&B OB INTERMEDIATE

## 2017-07-05 RX ORDER — POLYETHYLENE GLYCOL 3350 17 G/17G
17 POWDER, FOR SOLUTION ORAL 2 TIMES DAILY PRN
Status: DISCONTINUED | OUTPATIENT
Start: 2017-07-05 | End: 2017-07-27

## 2017-07-05 RX ADMIN — NIFEDIPINE 20 MG: 10 CAPSULE, LIQUID FILLED ORAL at 16:27

## 2017-07-05 RX ADMIN — PRENATAL VIT W/ FE FUMARATE-FA TAB 27-0.8 MG 1 TABLET: 27-0.8 TAB at 08:50

## 2017-07-05 RX ADMIN — NIFEDIPINE 20 MG: 10 CAPSULE, LIQUID FILLED ORAL at 22:31

## 2017-07-05 RX ADMIN — NIFEDIPINE 20 MG: 10 CAPSULE, LIQUID FILLED ORAL at 04:25

## 2017-07-05 RX ADMIN — NIFEDIPINE 20 MG: 10 CAPSULE, LIQUID FILLED ORAL at 10:30

## 2017-07-05 RX ADMIN — POLYETHYLENE GLYCOL 3350 17 G: 17 POWDER, FOR SOLUTION ORAL at 16:27

## 2017-07-05 NOTE — PLAN OF CARE
Patient resting comfortably all day. Denies increase of pain throughout day. Patient verbalized understanding to report any changes in contraction intensity or fetal movement. Occasional contractions with irritability. Denies bleeding or leaking of fluid. Report given to Jesenia SALAZAR RN.

## 2017-07-05 NOTE — PROGRESS NOTES
BRIEF NUTRITION NOTE      CURRENT DIET AND INTAKE:  Diet:  Regular diet          Per RN flowsheet pt consuming 75%-100% of meals ordered  Pt was busy on attempt to see. She did state that she had already received cafe menu for the week.     Weight:   Wt Readings from Last 10 Encounters:   06/22/17 111.1 kg (245 lb)   02/28/15 93.4 kg (206 lb)   09/11/12 107.5 kg (237 lb)   03/23/12 102.1 kg (225 lb)   01/16/06 101.3 kg (223 lb 6.4 oz)       LABS:  Labs noted    MALNUTRITION:  Patient does not meet two of the following criteria necessary for diagnosing malnutrition: significant weight loss, reduced intake, subcutaneous fat loss, muscle loss or fluid retention    NUTRITION INTERVENTION:  Nutrition Diagnosis:  No nutrition diagnosis at this time.    Implementation:  Nutrition Education:  None at this time, Offered to leave cafe menu, pt had already received one for this week.     FOLLOW UP/MONITORING:   Will re-evaluate in 7 - 10 days, or sooner, if re-consulted.    Rebeca Cruz RD

## 2017-07-05 NOTE — PLAN OF CARE
Patient reports mild pain from a hemorrhoid. Tucks given. Patient requests miralax for constipation. Orders received from Dr. ROSELYN Meraz.

## 2017-07-05 NOTE — PLAN OF CARE
Problem:  Labor (Adult,Obstetrics,Pediatric)  Goal: Signs and Symptoms of Listed Potential Problems Will be Absent or Manageable ( Labor)  Signs and symptoms of listed potential problems will be absent or manageable by discharge/transition of care (reference  Labor (Adult,Obstetrics,Pediatric) CPG).   Outcome: No Change  Pt doing well this evening.  Denies change in cramping/tightening.  Denies LOF or vaginal bleeding.  Difficulty monitoring this evening on baby b d/t fetal movement and fetal position.  Pt watching fireworks during this time.  Pt instructed to call during the night if notes a change in contraction pattern in frequency and/or intensity, vaginal bleeding, LOF, or any other concerns.

## 2017-07-05 NOTE — PLAN OF CARE
Problem:  Labor (Adult,Obstetrics,Pediatric)  Goal: Signs and Symptoms of Listed Potential Problems Will be Absent or Manageable ( Labor)  Signs and symptoms of listed potential problems will be absent or manageable by discharge/transition of care (reference  Labor (Adult,Obstetrics,Pediatric) CPG).   Outcome: No Change  Pt slept well during the night.  No change in cramping/tightening feeling during the night. Pt denies vaginal bleeding and LOF.  NST obtained on both babies from 3642-1879.  Report to Maria Ines LANDAVERDE Rn will assume care.

## 2017-07-05 NOTE — PROGRESS NOTES
"S: No acute overnight events.  Pt denies any regular UC/VB/LOF. +FM x 2. Notes that in the past few days she has had an increase in vaginal pressure, more when she has a contraction.       O: /63  Pulse 88  Temp 97.5  F (36.4  C) (Temporal)  Resp 16  Ht 1.676 m (5' 6\")  Wt 111.1 kg (245 lb)  LMP 11/10/2016  BMI 39.54 kg/m2  Gen: NAD, A&O x 3  Abd: Gravid, NT  SVE: /0  FHT:  Cat I  TOCO: baseline irritability          A/P: 37yo  @ 33+4/7 wga di-di twin gestation, HD#12 admitted for PTL, advanced cervical dilation.  AVSS.      1. Arrested PTL/ACD:  - s/p BMZ, s/p MgSO4  - continue nifedipine for maintenance tocolysis until 34 wga per MFM.  Appreciate recs.    - GBS neg      2. Maternal Status: stable  - regular diet as tolerated  - SCDs when in bed  - Tenriism - declines blood products but ok with cell saver (phone [phone]).      3. Fetal Statuses: overall reassuring x 2  - NSTs Qshift.  BPP 2x/wk.  Next BPP tomorrow.   - s/p BMZ for FLM  - per MFM, consider repeat course of BMZ closer to 34 wga if in labor      4. Dispo: continue inpatient management until delivery due to ACD; pt also lives 45min from hospital.      Linda Meraz  "

## 2017-07-06 ENCOUNTER — HOSPITAL ENCOUNTER (INPATIENT)
Dept: ULTRASOUND IMAGING | Facility: CLINIC | Age: 37
End: 2017-07-06
Attending: OBSTETRICS & GYNECOLOGY
Payer: COMMERCIAL

## 2017-07-06 PROCEDURE — 25000132 ZZH RX MED GY IP 250 OP 250 PS 637: Performed by: OBSTETRICS & GYNECOLOGY

## 2017-07-06 PROCEDURE — 12000029 ZZH R&B OB INTERMEDIATE

## 2017-07-06 PROCEDURE — 76819 FETAL BIOPHYS PROFIL W/O NST: CPT

## 2017-07-06 RX ADMIN — PRENATAL VIT W/ FE FUMARATE-FA TAB 27-0.8 MG 1 TABLET: 27-0.8 TAB at 08:18

## 2017-07-06 RX ADMIN — NIFEDIPINE 20 MG: 10 CAPSULE, LIQUID FILLED ORAL at 04:31

## 2017-07-06 NOTE — PROGRESS NOTES
"(Late Entry)    S: Pt slept well. Feeling well. Occasional pressure. Last dosage of her nifedapine was at 0430.     O:  /56  Pulse 88  Temp 98.1  F (36.7  C) (Temporal)  Resp 18  Ht 1.676 m (5' 6\")  Wt 111.1 kg (245 lb)  LMP 11/10/2016  BMI 39.54 kg/m2  SVE () 6/80/-2  TOCO: irritability  FHR: reassuring baseline of both babies 140-150 and accelerations.     A/P: 35 yo Di/Di IUP at 34+0/7 wks with advanced cervical dilation  1. S/P BMZ and MgSO4 exposure. D/C Nifedipine today as she is 34 weeks.   2. Plan for vaginal delivery assessing positions of babies at time of delivery.   3. Continue with 2x weekly  testing and Q 3 week growth.   4. Declines blood products in event of life saving measures. Will have cell saver available if c/s.     Linda Meraz    "

## 2017-07-06 NOTE — PLAN OF CARE
1915 Report received from Maria Ines Dawn, RN to assume cares at this time.   1930 Patient reporting intermittent cramping with no increase in intensity during contractions, denies leaking of fluid or bleeding. Tracing occasional contractions. Encouraged patient to call with any changes.  2230 Nifedipine given per orders. Patient denies leaking of fluid or bleeding. Tracing occasional contractions. Patient reporting same cramping felt during contractions.   0430 Nifedipine given per orders. Patient reporting backache. Patient also reports that she has not voided since 2344. Encouraged patient to empty bladder at this time and more regularly.  0630 Patient had BM and reports backache is improved. Continues to report cramping with occasional contractions. Denies leaking of fluid or bleeding.  0725 Report given to Marianne Uriarte RN and Jesse Gonzalez RN to assume cares at this time.

## 2017-07-06 NOTE — PLAN OF CARE
0703 - Report received from ROYER Erwin RN.  All cares assumed.  In to hole monitor on baby B for NST.  Accels noted prior to this RN assuming cares.  Baby B very active and is felt by mother and RN to be moving well.    8929 - Monitors removed per Dr. Dozier's order.  Patient plans to eat breakfast and shower this morning.    0800 - MFM called for confirmation of BPP today.  Plan for BPP by MFM at 1145 today.  Dr. Meraz in dept/at bedside for eval.  Orders confirmed for q shift toco/NST or prn for increased contractions and D/C of all PTL orders.  Orders received for IV and epidural prn.

## 2017-07-06 NOTE — PROGRESS NOTES
Please see full imaging report from [employer] program under imaging tab.    BPP 8/8 for each twin. Cephalic/transverse. Follow up as requested.     Shivam Woods MD  Maternal Fetal Medicine

## 2017-07-06 NOTE — PLAN OF CARE
Patient rested comfortably throughout day. Reports no change in contraction intensity. Denies leaking of fluid or bleeding. Occasional contractions on toco with irritability. Report given to Erlinda YOUSIF RN.

## 2017-07-07 PROCEDURE — 12000029 ZZH R&B OB INTERMEDIATE

## 2017-07-07 PROCEDURE — 25000132 ZZH RX MED GY IP 250 OP 250 PS 637: Performed by: OBSTETRICS & GYNECOLOGY

## 2017-07-07 RX ADMIN — PRENATAL VIT W/ FE FUMARATE-FA TAB 27-0.8 MG 1 TABLET: 27-0.8 TAB at 08:37

## 2017-07-07 NOTE — PLAN OF CARE
Problem:  Labor (Adult,Obstetrics,Pediatric)  Goal: Signs and Symptoms of Listed Potential Problems Will be Absent or Manageable ( Labor)  Signs and symptoms of listed potential problems will be absent or manageable by discharge/transition of care (reference  Labor (Adult,Obstetrics,Pediatric) CPG).   Outcome: No Change  Pt able to sleep overnight.    [id number]-HSK/US applied during this time.  Fetal movement noted per pt and nurse.  Contractions palpate as mild at this time with soft resting tone between.  Pt denies vaginal bleeding or ROM.  Pt states her contractions have been slightly stronger since she has stopped her Nifedipine, but not at the point she would ask for pain medication.  She states she would not mention anything about it, but the nurses ask her about it.    0645-Pt instructed to call if notes increase in frequency and/or intensity in contractions, vaginal bleeding, ROM, or any other concerns.

## 2017-07-07 NOTE — PLAN OF CARE
Problem: Goal Outcome Summary  Goal: Goal Outcome Summary  Outcome: No Change  2145-Nurse entered room to place pt on monitor.  Pt was in bed sleeping.  2154-EUM/US applied.   2200-Pt on phone with  during this time.  Pt calm and sleepy.  2214-EUM/US removed.  Pt instructed to call if notes any increase in Frequency and/or intensity in contractions compared to what she has been experiencing all day today.  Pt states she has been feeling slight increase in cramping and pressure all day since Nifedipine has been stopped.   Will continue to check pt periodically during the night.  Last SVE was on Wednesday at which time she was 6/80%/0 station.

## 2017-07-07 NOTE — PLAN OF CARE
2345-pt states feeling contractions more in her back and they wrap around.  Pt feels she may be able to sleep through them.  Contractions palpating mild and soft between.  +Fetal movement noted during this time.  Pt still would like to wait on S.L. Placement.   Will reassess pt in 45 minutes.

## 2017-07-07 NOTE — PLAN OF CARE
0030-Nurse at bedside for 10 minutes.  Pt sleeping during this time.  Pt awoke when nurse leaving room.  States able to rest at this time.  Will continue to monitor.  Pt to call if notes any change in status.

## 2017-07-07 NOTE — PLAN OF CARE
1930-reviewed POC with patient.  Pt states slight increase in frequency of cramping.  Pt still doing well.  Denies vaginal bleeding or LOF.  Discussed placing a saline lock in case starts to labor overnight tonight.  Pt states would prefer not have one until in active labor.  This nurse request to look at pt arms to assess for possible sights later.      2035-Dr. Gonzalez called updated on pt status.  Will follow pt until 0200 tonight if goes into labor otherwise call on-call MD for group.  Plan on use of cell saver only if pt converts to c/s per Dr. Gonzalez.

## 2017-07-07 NOTE — PLAN OF CARE
2050-Pt states noting discomfort anterior lateral on right leg.  No warmth, redness, swelling noted at sight. Pt had been walking more today then has in the last few weeks.    2055-Dr. Alexander called and updated on above information.  Also updated on phone call with Dr. Gonzalez.  No new orders at this time.

## 2017-07-07 NOTE — PLAN OF CARE
Pt up ad deyanira in room and on unit. VSS. Pt c/o occasional ctxs.    Pt took a brief walk in hospital and outside. Pt tolerated this well, but tires easily.    Report to Jesenia Barrett RN

## 2017-07-08 PROCEDURE — 25000132 ZZH RX MED GY IP 250 OP 250 PS 637: Performed by: OBSTETRICS & GYNECOLOGY

## 2017-07-08 PROCEDURE — 12000029 ZZH R&B OB INTERMEDIATE

## 2017-07-08 PROCEDURE — 40000893 ZZH STATISTIC PT IP EVAL DEFER: Performed by: PHYSICAL THERAPIST

## 2017-07-08 RX ADMIN — PRENATAL VIT W/ FE FUMARATE-FA TAB 27-0.8 MG 1 TABLET: 27-0.8 TAB at 07:47

## 2017-07-08 NOTE — PROGRESS NOTES
HD15  No c/o  AFVSS  [place] occ  Cat 1 tracing    Continue inpatient management for twins at 34+1 with advanced cervical dilation  BPPs twice weekly  Growth US every 3 weeks  Expect vaginal delivery

## 2017-07-08 NOTE — PLAN OF CARE
Problem: Goal Outcome Summary  Goal: Goal Outcome Summary  PT: Orders received, chart reviewed.  Per chart and discussion with RN, pt is currently off bed rest (changed a few days ago) and has been able to ambulate around unit and even outside the other day.  At this time, RN states no further concerns for PT to address.  Will complete order.  Please re-consult if new needs arise.

## 2017-07-08 NOTE — PLAN OF CARE
Pt denies any pain. Pt is feeling UC describes them as mild. No complaints of ROM or bleeding. Monitored with   EFM of twin A and B. Held Twin B monitor on fetus active. VSS

## 2017-07-08 NOTE — PLAN OF CARE
2250-  Unable to keep baby B on monitor due to fetal movement.  RN at bedside and FHT's audible with EFM even when not reading on monitor.  FHT's 140's.

## 2017-07-08 NOTE — PROGRESS NOTES
HD16  Patient reports pressure with ambulation  AFVSS  [place] 0  Cat 1 tracing  Cx exam deferred    Di/Di twins at 34 +2 admitted for PTL and advanced cervical dilation  Plan admission until delivery  Expect vaginal delivery  Plan cell saver if  required as patient is a [employer]

## 2017-07-09 PROCEDURE — 25000132 ZZH RX MED GY IP 250 OP 250 PS 637: Performed by: OBSTETRICS & GYNECOLOGY

## 2017-07-09 PROCEDURE — 12000029 ZZH R&B OB INTERMEDIATE

## 2017-07-09 RX ADMIN — PRENATAL VIT W/ FE FUMARATE-FA TAB 27-0.8 MG 1 TABLET: 27-0.8 TAB at 08:15

## 2017-07-09 NOTE — PROGRESS NOTES
Writer at beside monitoring fetal heart tones of baby A and B. Baby B moving frequently, held U/S on abdomen throughout entire monitoring. Baby A and B have moderate variability. Pt reports feeling tired, but denies vaginal bleeding or loss of fluid. Family present to visit.

## 2017-07-09 NOTE — PROGRESS NOTES
HD 17  Patient is in good spirits. She denies significant change in contractions    AFVSS  [place] occasional  Cat 1 tracings  Cx exam deferred  1+ pedal edema    35 yo  at 34+3 with di/di twins  1)Inpatient until admission due to advanced cervical dilation  2)Twice weekly BPPs and q 3 week growth ultrasound  3)No further tocolysis  4)Expect vaginal delivery  5)Cell saver to be employed should  be necessary

## 2017-07-09 NOTE — PLAN OF CARE
2320- care assumed, denies discomfort, reports had a couple contractions earlier. None at present , denies need for anything, will call if needs anything, will plan on monitoring babies around 0600. Vitals stable.

## 2017-07-09 NOTE — PLAN OF CARE
Pt reports she slept well during the night. Occasional UC. Positive fetal movement x2. Pelvic pressure remains the same, mostly when she's moving. No new symptoms or concerns.

## 2017-07-09 NOTE — PLAN OF CARE
0620- monitors on, holding U/S to  baby B, having a few contractions, 9-11 minutes apart. Takes bigger breaths when loki. Unable to keep twin B on monitor when patient contracts. strip reassuring but not reactive. Vitals stable, denies feeling contractions during night, still tired this morning but states slept well. Will let RN know if continues to contract or contractions become stronger.

## 2017-07-10 ENCOUNTER — HOSPITAL ENCOUNTER (INPATIENT)
Dept: ULTRASOUND IMAGING | Facility: CLINIC | Age: 37
End: 2017-07-10
Attending: OBSTETRICS & GYNECOLOGY
Payer: COMMERCIAL

## 2017-07-10 PROCEDURE — 76819 FETAL BIOPHYS PROFIL W/O NST: CPT

## 2017-07-10 PROCEDURE — 12000029 ZZH R&B OB INTERMEDIATE

## 2017-07-10 PROCEDURE — 25000132 ZZH RX MED GY IP 250 OP 250 PS 637: Performed by: OBSTETRICS & GYNECOLOGY

## 2017-07-10 RX ADMIN — PRENATAL VIT W/ FE FUMARATE-FA TAB 27-0.8 MG 1 TABLET: 27-0.8 TAB at 08:04

## 2017-07-10 NOTE — PLAN OF CARE
Problem: Goal Outcome Summary  Goal: Goal Outcome Summary  Outcome: No Change  In pt's room at 0600 to place on monitors, pt states that she has been able to sleep well overnight and that the contractions seem better this morning. [place] and FHR monitor applied, babies are reassuring and baby A is reactive.

## 2017-07-10 NOTE — PROGRESS NOTES
"Please see \"Imaging\" tab under \"Chart Review\" for details of today's US.      Brittani Bingham, DO  Maternal-Fetal Medicine        "

## 2017-07-10 NOTE — PROGRESS NOTES
She is stable, no significant contractions    Exam:  Cervix 5-6 cm, posterior. No significant change from my previous exam.    Plan continue to observe in hospital.

## 2017-07-10 NOTE — PROVIDER NOTIFICATION
At 2320 pt complaining of not being able to sleep because she was feeling more cramping and tightening, reports having just been to the bathroom.  She states that it feels about the same as it has been all day but that today she has been more uncomfortable and describes them as stronger than menstrual cramps. [place] applied, 6 contractions with irritability seen over an hour. Pt states she feels like she has to breath through some of them. Dr. KETURAH Dozier updated, order for SVE if pt requests but otherwise to just continue to monitor. Discussed with pt, she declined SVE. [place] removed and pt is going to try to sleep. Pt instructed to call if she feels contractions get stronger or more frequent. Plan to monitor FHTs and ctx again at 0600 unless pt calls earlier.

## 2017-07-10 NOTE — PROGRESS NOTES
"BRIEF NUTRITION ASSESSMENT      REASON FOR ASSESSMENT:  RD following for initial RN consult - \"Patient is pregnant with twins on bedrest. Long term hospitalization\"      CURRENT DIET AND INTAKE:  Diet:  Regular diet. 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 However, pt states that she is still ordering meals TID and consuming 100% of meals ordered.  Per [facility]. Pt ordered 1900kcal and 76g protein on 7/9.    ANTHROPOMETRICS:  Height: 5'6\"   Weight: 113.7 kg  Vitals:    06/22/17 1209 07/07/17 1348   Weight: 111.1 kg (245 lb) 113.7 kg (250 lb 12 oz)       LABS:  Labs noted    MALNUTRITION:  Patient does not meet two of the following criteria necessary for diagnosing malnutrition: significant weight loss, reduced intake, subcutaneous fat loss, muscle loss or fluid retention    NUTRITION INTERVENTION:  Nutrition Diagnosis:  No nutrition diagnosis at this time.    Implementation:  Nutrition Education:  No new education at this time.    FOLLOW UP/MONITORING:   Will re-evaluate in 7 - 10 days, or sooner, if re-consulted.          "

## 2017-07-10 NOTE — PLAN OF CARE
Problem: Labor (Cervical Ripen, Induct, Augment) (Adult,Obstetrics,Pediatric)  Goal: Signs and Symptoms of Listed Potential Problems Will be Absent or Manageable (Labor)  Signs and symptoms of listed potential problems will be absent or manageable by discharge/transition of care (reference Labor (Cervical Ripen, Induct, Augment) (Adult,Obstetrics,Pediatric) CPG).   Outcome: No Change  Pt doing well. Uneventful evening. VSS. Pt reports feeling intermittent cramping throughout the day. Fetal monitoring completed- baby B difficult to trace. No concerns at this time. Continue with plan of care.

## 2017-07-11 PROCEDURE — 25000132 ZZH RX MED GY IP 250 OP 250 PS 637: Performed by: OBSTETRICS & GYNECOLOGY

## 2017-07-11 PROCEDURE — 12000029 ZZH R&B OB INTERMEDIATE

## 2017-07-11 RX ADMIN — PRENATAL VIT W/ FE FUMARATE-FA TAB 27-0.8 MG 1 TABLET: 27-0.8 TAB at 07:59

## 2017-07-11 NOTE — PROGRESS NOTES
"HD19  Notes increased ctx during the day when she is active, fewer at night.  /55  Pulse 88  Temp 98.4  F (36.9  C)  Resp 16  Ht 1.676 m (5' 6\")  Wt 113.7 kg (250 lb 12 oz)  LMP 11/10/2016  BMI 40.47 kg/m2   Category 1 FHT x 2  1+ edema  Cervix: deferred    US 7/10: vtx/transverse, BPP 8/8 x 2    A/P: 36 year old  @ 34w5d with di/di twins, arrested PTL, advanced cervical dilation  - s/p BMZ; will attempt repeat course when in labor  - Twice weekly BPP, q3 wk growth  - Roman [employer], declines blood products, will call for cell saver in event of c/s  - GBS negative at 32 3/, will repeat GBS culture if undelivered at 36 3/7    Ira Dozier   "

## 2017-07-12 PROCEDURE — 12000029 ZZH R&B OB INTERMEDIATE

## 2017-07-12 PROCEDURE — 25000132 ZZH RX MED GY IP 250 OP 250 PS 637: Performed by: OBSTETRICS & GYNECOLOGY

## 2017-07-12 RX ADMIN — PRENATAL VIT W/ FE FUMARATE-FA TAB 27-0.8 MG 1 TABLET: 27-0.8 TAB at 09:45

## 2017-07-12 NOTE — PROGRESS NOTES
HD #20  34 6/7 Twins  Vss Afeb  Feeling well ctx unchanged  FHR Cat 1 x2    US 7/10: vtx/transverse, BPP 8/8 x 2     A/P: 36 year old  @ 34 6/ with di/di twins, arrested PTL, advanced cervical dilation  - s/p BMZ; will attempt repeat course when in labor  - Twice weekly BPP, q3 wk growth  - Mor[employer], declines blood products, will call for cell saver in event of c/s  - GBS negative at 32 3/7, will repeat GBS culture if undelivered at 36 3/7

## 2017-07-12 NOTE — PLAN OF CARE
Problem: Goal Outcome Summary  Goal: Goal Outcome Summary  Outcome: No Change  Patient slept and was comfortably throughout the night.  No complaints.  Denies leaking fluid and bleeding, reports occasional contraction.  Plan of care reviewed with patient, understanding verbalized.  Continue with plan of care

## 2017-07-12 NOTE — PLAN OF CARE
Problem:  Labor (Adult,Obstetrics,Pediatric)  Goal: Signs and Symptoms of Listed Potential Problems Will be Absent or Manageable ( Labor)  Signs and symptoms of listed potential problems will be absent or manageable by discharge/transition of care (reference  Labor (Adult,Obstetrics,Pediatric) CPG).   Outcome: No Change  Patient denies regular contractions, vaginal bleeding, notes +FM x 2.  She desires to go home; reassured her for reasons being here.  Pt off monitors and moving around room independently.  She wishes to go for a walk in hallways to step outside later.  VSS.  Bed changed.

## 2017-07-13 ENCOUNTER — HOSPITAL ENCOUNTER (INPATIENT)
Dept: ULTRASOUND IMAGING | Facility: CLINIC | Age: 37
End: 2017-07-13
Attending: OBSTETRICS & GYNECOLOGY
Payer: COMMERCIAL

## 2017-07-13 PROCEDURE — 25000132 ZZH RX MED GY IP 250 OP 250 PS 637: Performed by: OBSTETRICS & GYNECOLOGY

## 2017-07-13 PROCEDURE — 12000029 ZZH R&B OB INTERMEDIATE

## 2017-07-13 PROCEDURE — 76819 FETAL BIOPHYS PROFIL W/O NST: CPT

## 2017-07-13 RX ADMIN — PRENATAL VIT W/ FE FUMARATE-FA TAB 27-0.8 MG 1 TABLET: 27-0.8 TAB at 07:38

## 2017-07-13 NOTE — PLAN OF CARE
Patient denies regular contractions, vaginal bleeding, notes +FM x 2. Patient did not sleep well overnight due to intermittent back discomfort which was relieved with repositioning. Pt off monitors and moving around room independently. VSS. Patient smiling and talkative this morning.

## 2017-07-13 NOTE — PROGRESS NOTES
"Please see \"Imaging\" tab under \"Chart Review\" for details of today's US.  Admitted with advanced cervical dilation.  Kt Meraz called with questions about AROM and delivery planning, recommended expectant management without intervention until term.      Brittani Bingham, DO  Maternal-Fetal Medicine        "

## 2017-07-13 NOTE — PLAN OF CARE
Problem:  Labor (Adult,Obstetrics,Pediatric)  Goal: Signs and Symptoms of Listed Potential Problems Will be Absent or Manageable ( Labor)  Signs and symptoms of listed potential problems will be absent or manageable by discharge/transition of care (reference  Labor (Adult,Obstetrics,Pediatric) CPG).   Outcome: No Change  Katie is a 35yo  35w0d twin gestation here with advanced dilatation. +FM x2, NST reactive x2. No bleeding or cxns. Pt is in good spirits this am. Has consent for release of information for insurance purposes. Up ad deyanira in room.

## 2017-07-13 NOTE — PLAN OF CARE
Problem: Labor (Cervical Ripen, Induct, Augment) (Adult,Obstetrics,Pediatric)  Goal: Signs and Symptoms of Listed Potential Problems Will be Absent or Manageable (Labor)  Signs and symptoms of listed potential problems will be absent or manageable by discharge/transition of care (reference Labor (Cervical Ripen, Induct, Augment) (Adult,Obstetrics,Pediatric) CPG).   Outcome: No Change  William is a 35yo  here with advanced premature dilatation. Denies painful cxns, bleeding or ROM. +FM x2. NST reactive x2. Was feeling down this afternoon. RN took her outside for 30+ mins and pt was smiling and grateful, states it boosted her mood immensely.

## 2017-07-13 NOTE — PROGRESS NOTES
HD #21  35+0/7 Twins  Vss Afeb  Feeling well ctx unchanged  FHR Cat 1 x2     US 7/10: vtx/transverse, BPP 8/8 x 2      A/P: 36 year old  @ 34 6/7 with di/di twins, arrested PTL, advanced cervical dilation  - s/p BMZ; will attempt repeat course when in labor  - Twice weekly BPP (due today), q3 wk growth  - Religion, declines blood products, will call for cell saver in event of c/s  - GBS negative at 32 3/7, will repeat GBS culture if undelivered at 36 3/7  - Discussed with MFM. If still pregnant at 37+0/7 weeks induction  - Discussed will reassess with next growth scan if breech extraction of baby B is possible.     Linda Meraz

## 2017-07-14 PROCEDURE — 12000029 ZZH R&B OB INTERMEDIATE

## 2017-07-14 PROCEDURE — 25000132 ZZH RX MED GY IP 250 OP 250 PS 637: Performed by: OBSTETRICS & GYNECOLOGY

## 2017-07-14 RX ADMIN — PRENATAL VIT W/ FE FUMARATE-FA TAB 27-0.8 MG 1 TABLET: 27-0.8 TAB at 10:06

## 2017-07-14 NOTE — PROGRESS NOTES
HD 22  Patient reports back pain this am but denies cramping or pressure or change in discharge  AFVSS  [place] q 5-6  NST reactive x 2  Cx exam deferred    Continue inpatient management for twins at 35+1 with advanced cervical dilation  US BPP twice weekly   Plan for vaginal delivery   Would need to recruit cell saver if

## 2017-07-14 NOTE — PLAN OF CARE
Problem:  Labor (Adult,Obstetrics,Pediatric)  Goal: Signs and Symptoms of Listed Potential Problems Will be Absent or Manageable ( Labor)  Signs and symptoms of listed potential problems will be absent or manageable by discharge/transition of care (reference  Labor (Adult,Obstetrics,Pediatric) CPG).   Outcome: No Change  Pt slept well overnight.  +fetal movement x2.  Fetal and uterine monitoring early this AM. Difficulty continuous monitoring of baby A d/t position.  Denies vaginal bleeding, LOC, or change in contraction pattern.  Pt instructed to call if notes change in contractions, vaginal bleeding, or ROM.

## 2017-07-14 NOTE — PLAN OF CARE
Problem:  Labor (Adult,Obstetrics,Pediatric)  Goal: Signs and Symptoms of Listed Potential Problems Will be Absent or Manageable ( Labor)  Signs and symptoms of listed potential problems will be absent or manageable by discharge/transition of care (reference  Labor (Adult,Obstetrics,Pediatric) CPG).   Outcome: No Change  Pt feeling occasional cramping.  Resting and watching TV at this time.  Denies change in contraction pattern, vaginal bleeding, or LOC.  Pt will call with any concerns during the night.

## 2017-07-15 PROCEDURE — 25000132 ZZH RX MED GY IP 250 OP 250 PS 637: Performed by: OBSTETRICS & GYNECOLOGY

## 2017-07-15 PROCEDURE — 12000029 ZZH R&B OB INTERMEDIATE

## 2017-07-15 RX ADMIN — PRENATAL VIT W/ FE FUMARATE-FA TAB 27-0.8 MG 1 TABLET: 27-0.8 TAB at 09:39

## 2017-07-15 NOTE — PLAN OF CARE
Problem: Goal Outcome Summary  Goal: Goal Outcome Summary  Outcome: No Change  Patient denies leaking, bleeding and reports occasional contraction.  Positive fetal movement per patient.  Plan of care reviewed with patient, understanding verbalized.  Patient slept comfortably throughout the night without complaints.  Continue with plan of care.

## 2017-07-15 NOTE — PROGRESS NOTES
HD 23  Patient reports back pain this am but denies cramping or pressure or change in discharge  AFVSS  [place] q 5-6  NST reactive x 2  Cx exam deferred     Continue inpatient management for twins at 35+2 with advanced cervical dilation  US BPP twice weekly   Plan for vaginal delivery   Would need to recruit cell saver if

## 2017-07-16 LAB
ALBUMIN UR-MCNC: NEGATIVE MG/DL
APPEARANCE UR: ABNORMAL
BACTERIA #/AREA URNS HPF: ABNORMAL /HPF
BILIRUB UR QL STRIP: NEGATIVE
COLOR UR AUTO: YELLOW
GLUCOSE UR STRIP-MCNC: NEGATIVE MG/DL
HGB UR QL STRIP: NEGATIVE
KETONES UR STRIP-MCNC: 10 MG/DL
LEUKOCYTE ESTERASE UR QL STRIP: ABNORMAL
MUCOUS THREADS #/AREA URNS LPF: PRESENT /LPF
NITRATE UR QL: NEGATIVE
PH UR STRIP: 6 PH (ref 5–7)
RBC #/AREA URNS AUTO: 8 /HPF (ref 0–2)
SP GR UR STRIP: 1.01 (ref 1–1.03)
SQUAMOUS #/AREA URNS AUTO: 7 /HPF (ref 0–1)
URN SPEC COLLECT METH UR: ABNORMAL
UROBILINOGEN UR STRIP-MCNC: NORMAL MG/DL (ref 0–2)
WBC #/AREA URNS AUTO: 82 /HPF (ref 0–2)

## 2017-07-16 PROCEDURE — 87086 URINE CULTURE/COLONY COUNT: CPT | Performed by: OBSTETRICS & GYNECOLOGY

## 2017-07-16 PROCEDURE — 12000029 ZZH R&B OB INTERMEDIATE

## 2017-07-16 PROCEDURE — 81001 URINALYSIS AUTO W/SCOPE: CPT | Performed by: OBSTETRICS & GYNECOLOGY

## 2017-07-16 PROCEDURE — 25000132 ZZH RX MED GY IP 250 OP 250 PS 637: Performed by: OBSTETRICS & GYNECOLOGY

## 2017-07-16 RX ORDER — NITROFURANTOIN 25; 75 MG/1; MG/1
100 CAPSULE ORAL EVERY 12 HOURS SCHEDULED
Status: COMPLETED | OUTPATIENT
Start: 2017-07-16 | End: 2017-07-22

## 2017-07-16 RX ADMIN — PRAMOXINE HYDROCHLORIDE HYDROCORTISONE ACETATE 1 APPLICATORFUL: 100; 100 AEROSOL, FOAM TOPICAL at 14:00

## 2017-07-16 RX ADMIN — NITROFURANTOIN MONOHYDRATE/MACROCRYSTALLINE 100 MG: 25; 75 CAPSULE ORAL at 11:38

## 2017-07-16 RX ADMIN — NITROFURANTOIN MONOHYDRATE/MACROCRYSTALLINE 100 MG: 25; 75 CAPSULE ORAL at 23:54

## 2017-07-16 RX ADMIN — PRAMOXINE HYDROCHLORIDE HYDROCORTISONE ACETATE 1 APPLICATORFUL: 100; 100 AEROSOL, FOAM TOPICAL at 23:54

## 2017-07-16 RX ADMIN — PRENATAL VIT W/ FE FUMARATE-FA TAB 27-0.8 MG 1 TABLET: 27-0.8 TAB at 11:38

## 2017-07-16 NOTE — PROVIDER NOTIFICATION
Patient called out at 0400 stating she awoke to severe low back pain with nausea.  States that pain is constant and sharp, initially reported pelvic pressure but has resolved.  Denies burning with urination, hematuria, constipation.  Applied heat to low back, nausea resolved with saltine crackers.  Patient declined pain and antinausea medication medications.  x2 contractions from [id number], felt by patient.    Notified Dr. Ricks patient status.  Will check UA.

## 2017-07-16 NOTE — PROGRESS NOTES
HD 24  Patient reports back pain this am but denies cramping or pressure or change in discharge  AFVSS  [place] q irreg  NST reactive x 2  Back- Neg CVAT  Cx - 5/ 80/-2  UA pos      Twins PTL/ACD stable. UTI    Rx macrobid  Continue inpatient management for twins at 35+3 with advanced cervical dilation  US BPP twice weekly   Plan for vaginal delivery   Would need to recruit cell saver if

## 2017-07-17 ENCOUNTER — HOSPITAL ENCOUNTER (INPATIENT)
Dept: ULTRASOUND IMAGING | Facility: CLINIC | Age: 37
End: 2017-07-17
Attending: OBSTETRICS & GYNECOLOGY
Payer: COMMERCIAL

## 2017-07-17 LAB
BACTERIA SPEC CULT: NORMAL
Lab: NORMAL
MICRO REPORT STATUS: NORMAL
SPECIMEN SOURCE: NORMAL

## 2017-07-17 PROCEDURE — 12000029 ZZH R&B OB INTERMEDIATE

## 2017-07-17 PROCEDURE — 25000132 ZZH RX MED GY IP 250 OP 250 PS 637: Performed by: OBSTETRICS & GYNECOLOGY

## 2017-07-17 PROCEDURE — 76819 FETAL BIOPHYS PROFIL W/O NST: CPT

## 2017-07-17 RX ORDER — MAGNESIUM SULFATE HEPTAHYDRATE 40 MG/ML
INJECTION, SOLUTION INTRAVENOUS
Status: DISCONTINUED
Start: 2017-07-17 | End: 2017-07-27 | Stop reason: HOSPADM

## 2017-07-17 RX ORDER — MAGNESIUM SULFATE IN WATER 40 MG/ML
INJECTION, SOLUTION INTRAVENOUS
Status: DISCONTINUED
Start: 2017-07-17 | End: 2017-07-27 | Stop reason: HOSPADM

## 2017-07-17 RX ADMIN — PRAMOXINE HYDROCHLORIDE HYDROCORTISONE ACETATE 1 APPLICATORFUL: 100; 100 AEROSOL, FOAM TOPICAL at 14:43

## 2017-07-17 RX ADMIN — PRENATAL VIT W/ FE FUMARATE-FA TAB 27-0.8 MG 1 TABLET: 27-0.8 TAB at 08:44

## 2017-07-17 RX ADMIN — NITROFURANTOIN MONOHYDRATE/MACROCRYSTALLINE 100 MG: 25; 75 CAPSULE ORAL at 23:36

## 2017-07-17 RX ADMIN — NITROFURANTOIN MONOHYDRATE/MACROCRYSTALLINE 100 MG: 25; 75 CAPSULE ORAL at 11:08

## 2017-07-17 NOTE — PROGRESS NOTES
HD 25     Back pain has resolved. Notes pedal edema. Denies pain.    VSS afebrile  Abdomen soft and nt  Fundus soft and nt  cx deferred  Extremities +2 edema    Assessment:  IUP at 35 4/7 weeks   PTL/ACD stable. No contractions.   UTI on Macrobid    Plan:  Continue inpatient management for twins at 35 4/7 weeks with advanced dilation but          unchanged  BPP twice a week  Growth u/s later this week  Anticipate vaginal delivery  Will call for cell saver if  section pt is a Bahai  Induction at 37 weeks if still pregnant

## 2017-07-17 NOTE — PLAN OF CARE
Problem:  Labor (Adult,Obstetrics,Pediatric)  Goal: Signs and Symptoms of Listed Potential Problems Will be Absent or Manageable ( Labor)  Signs and symptoms of listed potential problems will be absent or manageable by discharge/transition of care (reference  Labor (Adult,Obstetrics,Pediatric) CPG).   Patient doing well, has some back discomfort. Occasional contractions, denies bleeding or leaking of fluid. On antibiotics for UTI.

## 2017-07-18 PROCEDURE — 25000132 ZZH RX MED GY IP 250 OP 250 PS 637: Performed by: OBSTETRICS & GYNECOLOGY

## 2017-07-18 PROCEDURE — 12000029 ZZH R&B OB INTERMEDIATE

## 2017-07-18 RX ADMIN — PRENATAL VIT W/ FE FUMARATE-FA TAB 27-0.8 MG 1 TABLET: 27-0.8 TAB at 09:25

## 2017-07-18 RX ADMIN — NITROFURANTOIN MONOHYDRATE/MACROCRYSTALLINE 100 MG: 25; 75 CAPSULE ORAL at 11:30

## 2017-07-18 NOTE — PROVIDER NOTIFICATION
07/18/17 0725   Provider Notification   Provider Name/Title Dr. Meraz   Method of Notification Electronic Page   Request Evaluate - Remote   Notification Reason Other (Comment)  (Baby B no accels during AM monitoring)     Dr. Meraz notified of Baby B FHTs reassuring, but no accels noted during AM monitoring. +FM felt and reported by patient.

## 2017-07-18 NOTE — PROGRESS NOTES
"HD26  Feels well, notes 2-3 ctx/hr  /53  Pulse 88  Temp 98.4  F (36.9  C) (Temporal)  Resp 16  Ht 1.676 m (5' 6\")  Wt 113.7 kg (250 lb 12 oz)  LMP 11/10/2016  BMI 40.47 kg/m2   FHT Category 1 x 2  1+ edema    US vtx/transverse, BPP 8/8 x 2. A with polyhydramnios    A/P: 36 year old  @ 35w5d with d/di twins, arrested PTL, advanced cervical dilation  - s/p BMZ, attempt repeat course if in labor before 37 weeks  - Twice weekly BPP, q3wk growth  - Anglican, declines blood products, will call for cell saver in event of c/s  - GBS negative at 32 3/7, repeat GBS culture if undelivered at 36 3/7  - Possible induction ~37 weeks  - OK for patient to go outside for a walk    Ira Dozier   "

## 2017-07-18 NOTE — PLAN OF CARE
Problem: Goal Outcome Summary  Goal: Goal Outcome Summary  Outcome: No Change  VSS. Patient feeling tired today. Denies leaking of fluid or vaginal bleeding. Reports active babies. Encouraged to call with needs and questions. Continue to monitor.

## 2017-07-18 NOTE — PLAN OF CARE
Patient denies regular contractions, vaginal bleeding, notes +FM x 2. Patient slept well overnight. Pt off monitors and moving around room independently. VSS.

## 2017-07-19 PROCEDURE — 25000132 ZZH RX MED GY IP 250 OP 250 PS 637: Performed by: OBSTETRICS & GYNECOLOGY

## 2017-07-19 PROCEDURE — 12000029 ZZH R&B OB INTERMEDIATE

## 2017-07-19 RX ADMIN — NITROFURANTOIN MONOHYDRATE/MACROCRYSTALLINE 100 MG: 25; 75 CAPSULE ORAL at 23:31

## 2017-07-19 RX ADMIN — NITROFURANTOIN MONOHYDRATE/MACROCRYSTALLINE 100 MG: 25; 75 CAPSULE ORAL at 12:03

## 2017-07-19 RX ADMIN — PRAMOXINE HYDROCHLORIDE HYDROCORTISONE ACETATE 1 APPLICATORFUL: 100; 100 AEROSOL, FOAM TOPICAL at 10:21

## 2017-07-19 RX ADMIN — NITROFURANTOIN MONOHYDRATE/MACROCRYSTALLINE 100 MG: 25; 75 CAPSULE ORAL at 00:02

## 2017-07-19 RX ADMIN — PRENATAL VIT W/ FE FUMARATE-FA TAB 27-0.8 MG 1 TABLET: 27-0.8 TAB at 10:20

## 2017-07-19 NOTE — PLAN OF CARE
Patient ambulated outside with RN at side. Denies pain, leaking of fluid or bleeding. Vitals stable. Report given to Luzma LINTON RN.

## 2017-07-19 NOTE — PLAN OF CARE
1515- [facility] applied =- Baby B active -unable to monitor continuously-  with mod variability. Baby A-reactive NST.

## 2017-07-19 NOTE — PROGRESS NOTES
HD27  Feels well, no change in ctx  vss afeb   FHT Category 1 x 2  1+ edema     US vtx/transverse, BPP 8/8 x 2. A with polyhydramnios     A/P: 36 year old  @ 35w6d with d/di twins, arrested PTL, advanced cervical dilation  - s/p BMZ, attempt repeat course if in labor before 37 weeks  - Twice weekly BPP, q3wk growth  - Latter-day, declines blood products, will call for cell saver in event of c/s  - GBS negative at 32 3/7, repeat GBS culture if undelivered at 36 3/7  - Possible induction ~37 weeks

## 2017-07-20 ENCOUNTER — HOSPITAL ENCOUNTER (INPATIENT)
Dept: ULTRASOUND IMAGING | Facility: CLINIC | Age: 37
End: 2017-07-20
Attending: OBSTETRICS & GYNECOLOGY
Payer: COMMERCIAL

## 2017-07-20 PROCEDURE — 76819 FETAL BIOPHYS PROFIL W/O NST: CPT

## 2017-07-20 PROCEDURE — 76816 OB US FOLLOW-UP PER FETUS: CPT | Mod: 59

## 2017-07-20 PROCEDURE — 25000132 ZZH RX MED GY IP 250 OP 250 PS 637: Performed by: OBSTETRICS & GYNECOLOGY

## 2017-07-20 PROCEDURE — 76816 OB US FOLLOW-UP PER FETUS: CPT | Performed by: OBSTETRICS & GYNECOLOGY

## 2017-07-20 PROCEDURE — 12000029 ZZH R&B OB INTERMEDIATE

## 2017-07-20 RX ADMIN — NITROFURANTOIN MONOHYDRATE/MACROCRYSTALLINE 100 MG: 25; 75 CAPSULE ORAL at 11:28

## 2017-07-20 RX ADMIN — NITROFURANTOIN MONOHYDRATE/MACROCRYSTALLINE 100 MG: 25; 75 CAPSULE ORAL at 23:49

## 2017-07-20 RX ADMIN — PRENATAL VIT W/ FE FUMARATE-FA TAB 27-0.8 MG 1 TABLET: 27-0.8 TAB at 09:28

## 2017-07-20 NOTE — PROGRESS NOTES
"BRIEF NUTRITION RE-ASSESSMENT      REASON FOR RE-ASSESSMENT:  Follow-Up for RN Consult - \"Patient is pregnant with twins on bedrest. Long term hospitalization\"    CURRENT DIET AND INTAKE:  Diet:  Regular diet (Shellfish and tree nut allergy)              Patient continues to tolerate po intake.  Ordering full meals + using cafeteria menu for additional options due to prolonged hospital stay.    LABS:  Labs noted    NUTRITION INTERVENTION:  Nutrition Diagnosis:  No nutrition diagnosis at this time.    FOLLOW UP/MONITORING:   Will re-evaluate in 7 - 10 days, or sooner, if re-consulted.    Linda Jackson RD, LD, [facility]   Clinical Dietitian - [facility]           "

## 2017-07-20 NOTE — PROGRESS NOTES
"Please see \"Imaging\" tab under \"Chart Review\" for details of today's US.    Gayla Acosta, DO    "

## 2017-07-20 NOTE — PROGRESS NOTES
HD28  Feels well, no change in ctx  vss afeb   FHT Category 1 x 2  1+ edema      US vtx/transverse, BPP 8/8 x 2. A with polyhydramnios      A/P: 36 year old  @ 36+0/7 with d/di twins, arrested PTL, advanced cervical dilation  - s/p BMZ, attempt repeat course if in labor before 37 weeks  - Twice weekly BPP, q3wk growth. Due to day and ordered.  - Latter-day, declines blood products, will call for cell saver in event of c/s  - GBS negative at 32 3/7, repeat GBS culture if undelivered at 36 3/7 (saturday/ this week)  - Possible induction ~37 weeks. Discussed based on growth may discuss position with breech extraction further.     Linda Meraz

## 2017-07-20 NOTE — PLAN OF CARE
Problem: Goal Outcome Summary  Goal: Goal Outcome Summary  Outcome: No Change  Patient did well through the night. Stated she feels an occasional contraction. No leaking fluid or bleeding. Both babies active.

## 2017-07-21 PROCEDURE — 25000132 ZZH RX MED GY IP 250 OP 250 PS 637: Performed by: OBSTETRICS & GYNECOLOGY

## 2017-07-21 PROCEDURE — 12000029 ZZH R&B OB INTERMEDIATE

## 2017-07-21 RX ADMIN — PRENATAL VIT W/ FE FUMARATE-FA TAB 27-0.8 MG 1 TABLET: 27-0.8 TAB at 10:26

## 2017-07-21 RX ADMIN — NITROFURANTOIN MONOHYDRATE/MACROCRYSTALLINE 100 MG: 25; 75 CAPSULE ORAL at 22:21

## 2017-07-21 RX ADMIN — NITROFURANTOIN MONOHYDRATE/MACROCRYSTALLINE 100 MG: 25; 75 CAPSULE ORAL at 10:29

## 2017-07-21 RX ADMIN — PRAMOXINE HYDROCHLORIDE HYDROCORTISONE ACETATE 1 APPLICATORFUL: 100; 100 AEROSOL, FOAM TOPICAL at 10:27

## 2017-07-21 NOTE — PROGRESS NOTES
HD 29  No c/o  AFVSS  [place] occ  NST reactive x 2    Cx exam deferred    US from 17 showed  twin A vertex, 7lb 5oz, 82nd percentile      Twin B breech, 6lb 8oz, 57th percentile    Patient's first baby was 7lb9oz and labor/vaginal delivery were uncomplicated apart from rapid progress.  Patient desires vaginal delivery and is eligible as twin B is smaller than what her pelvis is proven to and is smaller than twin A.  However, she understands that attempting a vaginal delivery for twin can be unpredictable.  She understands that scheduling a  would be one way to have a controlled, predictable delivery and that an emergency  would increase her risks for bleeding and infection. She also understands that a full breech extraction, though clinically reasonable, can put twin B at more risk for trauma. She will further discuss with her  but, at this time, she is motivated for a trial of labor and vaginal delivery.

## 2017-07-21 NOTE — PLAN OF CARE
Problem: Goal Outcome Summary  Goal: Goal Outcome Summary  Outcome: No Change  Patient denies leaking, bleeding and  Reports occasional contraction.  Slept throughout the night with no complaints.  Reports that babies are active.  Family here to see patient last night.  Plan of care reviewed, understanding verbalized.

## 2017-07-21 NOTE — PROGRESS NOTES
EFM applied, Baby A reactive with accels. Baby B very difficult to trace. Monitors hand held for entire monitoring. Baby B non reactive. Will monitor again after pt eats dinner. Mom states she feels more pressure vaginally. No contractions noted during monitoring. No bleeding, or leaking of watery fluid. Verito Shetty RN

## 2017-07-21 NOTE — PLAN OF CARE
Problem:  Labor (Adult,Obstetrics,Pediatric)  Goal: Signs and Symptoms of Listed Potential Problems Will be Absent or Manageable ( Labor)  Signs and symptoms of listed potential problems will be absent or manageable by discharge/transition of care (reference  Labor (Adult,Obstetrics,Pediatric) CPG).   Outcome: No Change  Pt doing well.  Denies bleeding, LOF, and contractions.  Babies active, pt feeling normal movements.  Pt coping with hospitalization well.  She had several visitors today and is looking forward to her immediate family coming tomorrow.  Report given to Yolanda REBOLLAR RN.

## 2017-07-22 ENCOUNTER — APPOINTMENT (OUTPATIENT)
Dept: PHYSICAL THERAPY | Facility: CLINIC | Age: 37
End: 2017-07-22
Attending: OBSTETRICS & GYNECOLOGY
Payer: COMMERCIAL

## 2017-07-22 LAB
ERYTHROCYTE [DISTWIDTH] IN BLOOD BY AUTOMATED COUNT: 15 % (ref 10–15)
HCT VFR BLD AUTO: 35 % (ref 35–47)
HGB BLD-MCNC: 11.8 G/DL (ref 11.7–15.7)
MCH RBC QN AUTO: 26.5 PG (ref 26.5–33)
MCHC RBC AUTO-ENTMCNC: 33.7 G/DL (ref 31.5–36.5)
MCV RBC AUTO: 79 FL (ref 78–100)
PLATELET # BLD AUTO: 211 10E9/L (ref 150–450)
RBC # BLD AUTO: 4.45 10E12/L (ref 3.8–5.2)
WBC # BLD AUTO: 7.5 10E9/L (ref 4–11)

## 2017-07-22 PROCEDURE — 25000132 ZZH RX MED GY IP 250 OP 250 PS 637: Performed by: OBSTETRICS & GYNECOLOGY

## 2017-07-22 PROCEDURE — 97161 PT EVAL LOW COMPLEX 20 MIN: CPT | Mod: GP | Performed by: PHYSICAL THERAPIST

## 2017-07-22 PROCEDURE — 12000029 ZZH R&B OB INTERMEDIATE

## 2017-07-22 PROCEDURE — 40000193 ZZH STATISTIC PT WARD VISIT: Performed by: PHYSICAL THERAPIST

## 2017-07-22 PROCEDURE — 97110 THERAPEUTIC EXERCISES: CPT | Mod: GP | Performed by: PHYSICAL THERAPIST

## 2017-07-22 PROCEDURE — 85027 COMPLETE CBC AUTOMATED: CPT | Performed by: OBSTETRICS & GYNECOLOGY

## 2017-07-22 PROCEDURE — 36415 COLL VENOUS BLD VENIPUNCTURE: CPT | Performed by: OBSTETRICS & GYNECOLOGY

## 2017-07-22 RX ADMIN — ACETAMINOPHEN 325 MG: 325 TABLET, FILM COATED ORAL at 21:53

## 2017-07-22 RX ADMIN — NITROFURANTOIN MONOHYDRATE/MACROCRYSTALLINE 100 MG: 25; 75 CAPSULE ORAL at 21:53

## 2017-07-22 RX ADMIN — PRENATAL VIT W/ FE FUMARATE-FA TAB 27-0.8 MG 1 TABLET: 27-0.8 TAB at 09:08

## 2017-07-22 RX ADMIN — CALCIUM CARBONATE (ANTACID) CHEW TAB 500 MG 1000 MG: 500 CHEW TAB at 21:57

## 2017-07-22 RX ADMIN — NITROFURANTOIN MONOHYDRATE/MACROCRYSTALLINE 100 MG: 25; 75 CAPSULE ORAL at 10:21

## 2017-07-22 NOTE — PLAN OF CARE
Problem: Individualization  Goal: Patient Preferences  Outcome: No Change  Patient denies regular contractions, notes occasional ctxs - more intense now than in past, denies leaking fluid or vaginal bleeding, +FM x 2, category 1 tracing.  PT saw pt today, pt ambulated in hallways with RN, pt's mother at her bedside all day - pt appears happy to have her to communicate with & she gave her a leg/foot massage.  Hgb drawn today; 11.8.  Pt understands to alert RN if change in contractions or status.

## 2017-07-22 NOTE — PLAN OF CARE
Problem: Goal Outcome Summary  Goal: Goal Outcome Summary  PT: Orders received, eval and treatment completed.  Pt admitted antepartum, currently 36 weeks. IND with all mobility and ADLs.     Discharge Planner PT   Patient plan for discharge: Home  Current status: IND with all mobility; educated on and provided handouts for UE resistive ex, seated LE, supine LE, and standing LE HEP; pt declined performing each ex with PT but PT talked through each.  Provided with red and green theraband for UE ex.  Educated to stop exercises if increase in labor symptoms.  Explained frequency and intensity  Barriers to return to prior living situation: None  Recommendations for discharge: Home  Rationale for recommendations: No further acute PT needs identified.  Will complete order.       Entered by: Deana Marshall 07/22/2017 2:34 PM

## 2017-07-22 NOTE — PLAN OF CARE
Problem: Goal Outcome Summary  Goal: Goal Outcome Summary  Outcome: No Change  VSS. Patient rested comfortably NOC, feeling occasional contractions, denies vaginal leaking or bleeding. EFM monitoring Q shift, +FM, Cat I tracing with accels.

## 2017-07-22 NOTE — PROGRESS NOTES
"   07/22/17 1417   Quick Adds   Type of Visit Initial PT Evaluation   Living Environment   Living Environment Comment Pt has been inpatient since 6/22, currently 36 wks antepartum   Self-Care   Usual Activity Tolerance good   Current Activity Tolerance good   Regular Exercise (states was going to gym prior to OB telling her to stop)   Equipment Currently Used at Home none   Functional Level Prior   Ambulation 0-->independent   Transferring 0-->independent   Bathing 0-->independent   Dressing 0-->independent   Eating 0-->independent   Communication 0-->understands/communicates without difficulty   Swallowing 0-->swallows foods/liquids without difficulty   Cognition 0 - no cognition issues reported   Fall history within last six months no   Which of the above functional risks had a recent onset or change? none   Prior Functional Level Comment IND with functional mobility and ADLs   General Information   Onset of Illness/Injury or Date of Surgery - Date 07/22/17  (PT order received)   Referring Physician Linda Mcgovern MD   Patient/Family Goals Statement \"to have these babies soon\"   Pertinent History of Current Problem (include personal factors and/or comorbidities that impact the POC) Pt is currently 36wks antepartum, admitted since 6/22/17, on \"Modified bedrest\"   General Info Comments Activity: Up ad deyanira    Cognitive Status Examination   Orientation orientation to person, place and time   Level of Consciousness alert   Follows Commands and Answers Questions 100% of the time   Personal Safety and Judgment intact   Memory intact   Pain Assessment   Patient Currently in Pain No   Range of Motion (ROM)   ROM Comment WFL in BLE   Strength   Strength Comments WFL in BLE as observed through functional mobility   Bed Mobility   Bed Mobility Comments Reports IND with supine <> sit   Transfer Skills   Transfer Comments IND sit <> stand   Gait   Gait Comments IND ambulation without AD, fair gait speed and symmetric step " "length   Balance   Balance Comments Good, no LOB/path deviation   Sensory Examination   Sensory Perception Comments denies N/T   General Therapy Interventions   Planned Therapy Interventions strengthening;stretching;home program guidelines;progressive activity/exercise   Clinical Impression   Criteria for Skilled Therapeutic Intervention yes, treatment indicated   PT Diagnosis decreased strength   Influenced by the following impairments modified bed rest, prolonged hospitalization   Functional limitations due to impairments activity tolerance, strength   Clinical Presentation Stable/Uncomplicated   Clinical Presentation Rationale stable clinical picture this date (no cramps, bleeding, contractions)   Clinical Decision Making (Complexity) Low complexity   Therapy Frequency` daily   Predicted Duration of Therapy Intervention (days/wks) Eval +_1 treat   Anticipated Discharge Disposition Home   Risk & Benefits of therapy have been explained Yes   Patient, Family & other staff in agreement with plan of care Yes   [facility] [employer] \"6 Clicks\"   2016, Trustees of [facility], under license to [employer].  All rights reserved.   6 Clicks Short Forms Basic Mobility Inpatient Short Form   [facility] [employer]  \"6 Clicks\" V.2 Basic Mobility Inpatient Short Form   1. Turning from your back to your side while in a flat bed without using bedrails? 4 - None   2. Moving from lying on your back to sitting on the side of a flat bed without using bedrails? 4 - None   3. Moving to and from a bed to a chair (including a wheelchair)? 4 - None   4. Standing up from a chair using your arms (e.g., wheelchair, or bedside chair)? 4 - None   5. To walk in hospital room? 4 - None   6. Climbing 3-5 steps with a railing? 4 - None   Basic Mobility Raw Score (Score out of 24.Lower scores equate to lower levels of function) 24   Total Evaluation Time   Total Evaluation Time (Minutes) 4     "

## 2017-07-22 NOTE — PROGRESS NOTES
HD 30     Sitting up in bed. Complains of back pain. Denies UC/VB/ROM    +FM, Category 1 fetal tracing    IUP 36 2/7   Twin gestation Vertex/Breech  2nd twin is smaller than first twin  Premature dilation at 6+ cm, unchanged   Jehov[employer] declines all blood products  GBS negative at 32+ weeks      Plan  Induction at 37 weeks           Plan vaginal delivery with Breech extraction of twin 2           Risks reviewed for the above           Repeat GBS culture tomorrow           Will check CBC if anemic will consider IV Venofer           PT consult/massage therapy

## 2017-07-23 ENCOUNTER — APPOINTMENT (OUTPATIENT)
Dept: ULTRASOUND IMAGING | Facility: CLINIC | Age: 37
End: 2017-07-23
Attending: OBSTETRICS & GYNECOLOGY
Payer: COMMERCIAL

## 2017-07-23 PROCEDURE — 12000029 ZZH R&B OB INTERMEDIATE

## 2017-07-23 PROCEDURE — 25000132 ZZH RX MED GY IP 250 OP 250 PS 637: Performed by: OBSTETRICS & GYNECOLOGY

## 2017-07-23 PROCEDURE — 87653 STREP B DNA AMP PROBE: CPT | Performed by: OBSTETRICS & GYNECOLOGY

## 2017-07-23 PROCEDURE — 76819 FETAL BIOPHYS PROFIL W/O NST: CPT

## 2017-07-23 RX ADMIN — PRENATAL VIT W/ FE FUMARATE-FA TAB 27-0.8 MG 1 TABLET: 27-0.8 TAB at 08:00

## 2017-07-23 NOTE — PROGRESS NOTES
HD 31    Feeling better now. Was upset earlier feeling like there was no light at the end of the tunnel. Patient understands reason for waiting until 37 weeks for induction. Has minimally uncomfortable hemorrhoid. Is using Proctofoam HC. Feels that the hemorrhoid is now mostly external.    Denies VB/UC/ROM    Category 1 fetal tracing x 2    VSS afebrile  HEENT nl  Abdomen soft and nt  Fundus soft and nt  Extremities +2 edema  Perineum   Grape size hemorrhoid, no bleeding/clot noted  GBS culture obtained  Cervix exam deferred    Hgb= 11.8    Assessment  IUP at 36 3/7 weeks                        Twin gestation vertex/breech                         Advanced cervical dilation= stable                          Jehovah witness declines blood products, cell saver if  section                          Hemorrhoid = stable   Plan expectant management          Induce at 37 weeks          BPP tomorrow          Will try cream for hemorrhoid

## 2017-07-23 NOTE — PLAN OF CARE
Dr. Mcgovern paged regarding NST for day shift. Able to verify two babies. However, Baby A difficult to trace. And unable to get reactive strip. Baby B , reactive. Order given to do BPP.

## 2017-07-23 NOTE — PLAN OF CARE
Problem: Goal Outcome Summary  Goal: Goal Outcome Summary  Outcome: No Change  VSS. Patient reports having back pain that radiates to abdomen that woke her up. Patient encouraged to empty bladder then monitors applied, patient reports pain resolved and no contractions palpated or traced. States she is feeling occasional contractions, denies vaginal leaking or bleeding. EFM monitoring Q shift, +FM, Cat I tracing with accels.

## 2017-07-24 LAB
GP B STREP DNA SPEC QL NAA+PROBE: NORMAL
SPECIMEN SOURCE: NORMAL

## 2017-07-24 PROCEDURE — 25000132 ZZH RX MED GY IP 250 OP 250 PS 637: Performed by: OBSTETRICS & GYNECOLOGY

## 2017-07-24 PROCEDURE — 12000029 ZZH R&B OB INTERMEDIATE

## 2017-07-24 RX ADMIN — PRENATAL VIT W/ FE FUMARATE-FA TAB 27-0.8 MG 1 TABLET: 27-0.8 TAB at 08:36

## 2017-07-24 RX ADMIN — ACETAMINOPHEN 325 MG: 325 TABLET, FILM COATED ORAL at 01:53

## 2017-07-24 NOTE — PLAN OF CARE
Patient slept well overnight with heat pack to back and Tylenol po. Fetal heart tracings with moderate variability and accels twice this shift. Patient denies LOC, vaginal discharge or regular contractions. Report to Verito REBOLLAR RN to assume cares from this time.

## 2017-07-24 NOTE — PLAN OF CARE
"Problem: Goal Outcome Summary  Goal: Goal Outcome Summary  Outcome: No Change  VSS. Patient denies regular contractions, patient has noted occasional contractions and states \"more intense now than in past.\" Patient denies leaking fluid or vaginal bleeding. Patient reports +FM. Difficulty tracing FHT on Baby A - Dr. Mcgovern aware and BPP ordered. Patient spirits a little down in the morning but , daughter and parent-in-laws visited and patient seems to be \"happier.\"  Pt understands to alert RN if change in contractions or status.        "

## 2017-07-25 PROCEDURE — 25000132 ZZH RX MED GY IP 250 OP 250 PS 637: Performed by: OBSTETRICS & GYNECOLOGY

## 2017-07-25 PROCEDURE — 12000029 ZZH R&B OB INTERMEDIATE

## 2017-07-25 RX ADMIN — PRENATAL VIT W/ FE FUMARATE-FA TAB 27-0.8 MG 1 TABLET: 27-0.8 TAB at 08:32

## 2017-07-25 NOTE — PLAN OF CARE
Problem: Goal Outcome Summary  Goal: Goal Outcome Summary  Outcome: No Change  Pt resting comfortably in bed this shift with no complaints. VSS, category 1 fetal heart tracings for both twins. Pt up ad deyanira, eating and drinking well, and denies any pain.  Will continue to monitor.

## 2017-07-25 NOTE — PLAN OF CARE
Problem: Goal Outcome Summary  Goal: Goal Outcome Summary  Outcome: Improving  VSS.  Patient slept comfortably throughout night.  Category 1 tracing for Baby A and B.  No complaints of pain.  Will continue to monitor.

## 2017-07-25 NOTE — PROGRESS NOTES
"2017      S: No acute overnight events.  Pt states feels pressure, but no more than usual.  Denies any regular UC, VB, or LOF. +FM x 2.  Most recent US  showed vertex/transverse presentations.       O: /55  Pulse 67  Temp 98.1  F (36.7  C)  Resp 16  Ht 1.676 m (5' 6\")  Wt 113.7 kg (250 lb 12 oz)  LMP 11/10/2016  BMI 40.47 kg/m2  Gen: NAD, A&O x 3  Abd: Gravid, NT  Ext: mild edema BL LEs, symmetric, no CT        A/P: 37yo  @ 36.5 wga di-di twin gestation, HD #33 admitted for arrested PTL, advanced cervical dilation.  Afebrile, VSS.    1. Arrested PTL, ACD  - s/p BMZ x 1 course.  Consider repeating if labors prior to 37 wga  - s/p MgSO4    2. Maternal status: stable  - regular diet as tolerated  - Latter day - declines blood products but ok with cell saver.  Consider having it on standby on Thursday prior to delivery  - GBS neg    3. Fetal Statuses: overall reassuring x 2  - NST last PM/this AM reactive x 2. NST Qshift. BPP 2x/wk.   - s/p BMZ for FLM  - US 17: BPP 8/8 x 2, A vertex, B transverse  - Growth US 7/3/17: A 62% 5lb 7oz; B 48%, 5lb 0oz; discordance 8.3%    4. Dispo: continue intpatient management.  Plan for IOL at 37 wga.      "

## 2017-07-26 PROCEDURE — 12000029 ZZH R&B OB INTERMEDIATE

## 2017-07-26 PROCEDURE — 25000132 ZZH RX MED GY IP 250 OP 250 PS 637: Performed by: OBSTETRICS & GYNECOLOGY

## 2017-07-26 RX ADMIN — HYDROCORTISONE: 25 CREAM TOPICAL at 10:01

## 2017-07-26 RX ADMIN — PRENATAL VIT W/ FE FUMARATE-FA TAB 27-0.8 MG 1 TABLET: 27-0.8 TAB at 09:58

## 2017-07-26 NOTE — PROGRESS NOTES
S: No acute overnight events.  Pt states feels pressure, but no more than usual.  Denies any regular UC, VB, or LOF. +FM x 2.  Most recent US  showed vertex/transverse presentations.         O: VSS Afeb  Gen: NAD, A&O x 3  Abd: Gravid, NT  Ext: mild edema BL LEs, symmetric, no CT           A/P: 35yo  @ 36.6 wga di-di twin gestation, HD #33 admitted for arrested PTL, advanced cervical dilation.  Afebrile, VSS.     1. Arrested PTL, ACD  - s/p BMZ x 1 course.  Consider repeating if labors prior to 37 wga  - s/p MgSO4     2. Maternal status: stable  - regular diet as tolerated  - Pentecostal - declines blood products but ok with cell saver.  Consider having it on standby on Thursday prior to delivery  - GBS neg     3. Fetal Statuses: overall reassuring x 2  - NST last PM/this AM reactive x 2. NST Qshift. BPP 2x/wk.   - s/p BMZ for FLM  - US 17: BPP 8/8 x 2, A vertex, B transverse  - Growth US 7/3/17: A 62% 5lb 7oz; B 48%, 5lb 0oz; discordance 8.3%     4. Dispo: continue intpatient management.  Plan for IOL at 37 wga.               Revision History

## 2017-07-27 ENCOUNTER — ANESTHESIA (OUTPATIENT)
Dept: OBGYN | Facility: CLINIC | Age: 37
End: 2017-07-27
Payer: COMMERCIAL

## 2017-07-27 ENCOUNTER — ANESTHESIA EVENT (OUTPATIENT)
Dept: OBGYN | Facility: CLINIC | Age: 37
End: 2017-07-27
Payer: COMMERCIAL

## 2017-07-27 ENCOUNTER — APPOINTMENT (OUTPATIENT)
Dept: GENERAL RADIOLOGY | Facility: CLINIC | Age: 37
End: 2017-07-27
Attending: OBSTETRICS & GYNECOLOGY
Payer: COMMERCIAL

## 2017-07-27 PROBLEM — Z98.891 S/P PRIMARY LOW TRANSVERSE C-SECTION: Status: ACTIVE | Noted: 2017-07-27

## 2017-07-27 LAB
ABO + RH BLD: NORMAL
ABO + RH BLD: NORMAL
BASOPHILS # BLD AUTO: 0 10E9/L (ref 0–0.2)
BASOPHILS NFR BLD AUTO: 0.4 %
DIFFERENTIAL METHOD BLD: ABNORMAL
EOSINOPHIL # BLD AUTO: 0.1 10E9/L (ref 0–0.7)
EOSINOPHIL NFR BLD AUTO: 0.7 %
ERYTHROCYTE [DISTWIDTH] IN BLOOD BY AUTOMATED COUNT: 15.3 % (ref 10–15)
HCT VFR BLD AUTO: 38.2 % (ref 35–47)
HGB BLD-MCNC: 12.6 G/DL (ref 11.7–15.7)
IMM GRANULOCYTES # BLD: 0.1 10E9/L (ref 0–0.4)
IMM GRANULOCYTES NFR BLD: 0.7 %
LYMPHOCYTES # BLD AUTO: 2.5 10E9/L (ref 0.8–5.3)
LYMPHOCYTES NFR BLD AUTO: 30.2 %
MCH RBC QN AUTO: 26 PG (ref 26.5–33)
MCHC RBC AUTO-ENTMCNC: 33 G/DL (ref 31.5–36.5)
MCV RBC AUTO: 79 FL (ref 78–100)
MONOCYTES # BLD AUTO: 0.7 10E9/L (ref 0–1.3)
MONOCYTES NFR BLD AUTO: 7.9 %
NEUTROPHILS # BLD AUTO: 5.1 10E9/L (ref 1.6–8.3)
NEUTROPHILS NFR BLD AUTO: 60.1 %
NRBC # BLD AUTO: 0 10*3/UL
NRBC BLD AUTO-RTO: 0 /100
PLATELET # BLD AUTO: 201 10E9/L (ref 150–450)
RBC # BLD AUTO: 4.85 10E12/L (ref 3.8–5.2)
SPECIMEN EXP DATE BLD: NORMAL
WBC # BLD AUTO: 8.4 10E9/L (ref 4–11)

## 2017-07-27 PROCEDURE — 25000125 ZZHC RX 250: Performed by: OBSTETRICS & GYNECOLOGY

## 2017-07-27 PROCEDURE — 25000128 H RX IP 250 OP 636: Performed by: OBSTETRICS & GYNECOLOGY

## 2017-07-27 PROCEDURE — 25000128 H RX IP 250 OP 636: Performed by: ANESTHESIOLOGY

## 2017-07-27 PROCEDURE — 27210794 ZZH OR GENERAL SUPPLY STERILE: Performed by: OBSTETRICS & GYNECOLOGY

## 2017-07-27 PROCEDURE — 40000985 XR ABDOMEN PORT F1 VW

## 2017-07-27 PROCEDURE — 25000128 H RX IP 250 OP 636: Performed by: NURSE ANESTHETIST, CERTIFIED REGISTERED

## 2017-07-27 PROCEDURE — 85025 COMPLETE CBC W/AUTO DIFF WBC: CPT | Performed by: OBSTETRICS & GYNECOLOGY

## 2017-07-27 PROCEDURE — 86900 BLOOD TYPING SEROLOGIC ABO: CPT | Performed by: OBSTETRICS & GYNECOLOGY

## 2017-07-27 PROCEDURE — 12000037 ZZH R&B POSTPARTUM INTERMEDIATE

## 2017-07-27 PROCEDURE — 25000125 ZZHC RX 250: Performed by: ANESTHESIOLOGY

## 2017-07-27 PROCEDURE — 27211224 ZZ H OR BOWL PROCESSING SET 225ML FOR ELITE MACHINE CSE-P-225: Performed by: OBSTETRICS & GYNECOLOGY

## 2017-07-27 PROCEDURE — 27211220 ZZ H OR ASSEMBLY BASIC A&A LINE 00208-00: Performed by: OBSTETRICS & GYNECOLOGY

## 2017-07-27 PROCEDURE — 71000016 ZZH RECOVERY PHASE 1 LEVEL 3 FIRST HR: Performed by: OBSTETRICS & GYNECOLOGY

## 2017-07-27 PROCEDURE — 36415 COLL VENOUS BLD VENIPUNCTURE: CPT | Performed by: OBSTETRICS & GYNECOLOGY

## 2017-07-27 PROCEDURE — 27211219 ZZ H OR RESEVOIR 3L 150 MICRON FILTER CELLSAVER HARDSHELL 00205-00: Performed by: OBSTETRICS & GYNECOLOGY

## 2017-07-27 PROCEDURE — 25000125 ZZHC RX 250: Performed by: NURSE ANESTHETIST, CERTIFIED REGISTERED

## 2017-07-27 PROCEDURE — 88307 TISSUE EXAM BY PATHOLOGIST: CPT | Mod: 26,76 | Performed by: OBSTETRICS & GYNECOLOGY

## 2017-07-27 PROCEDURE — 86780 TREPONEMA PALLIDUM: CPT | Performed by: OBSTETRICS & GYNECOLOGY

## 2017-07-27 PROCEDURE — 88307 TISSUE EXAM BY PATHOLOGIST: CPT | Performed by: OBSTETRICS & GYNECOLOGY

## 2017-07-27 PROCEDURE — 86891 AUTOLOGOUS BLOOD OP SALVAGE: CPT | Performed by: OBSTETRICS & GYNECOLOGY

## 2017-07-27 PROCEDURE — 37000008 ZZH ANESTHESIA TECHNICAL FEE, 1ST 30 MIN: Performed by: OBSTETRICS & GYNECOLOGY

## 2017-07-27 PROCEDURE — 36000058 ZZH SURGERY LEVEL 3 EA 15 ADDTL MIN: Performed by: OBSTETRICS & GYNECOLOGY

## 2017-07-27 PROCEDURE — 36000056 ZZH SURGERY LEVEL 3 1ST 30 MIN: Performed by: OBSTETRICS & GYNECOLOGY

## 2017-07-27 PROCEDURE — 86901 BLOOD TYPING SEROLOGIC RH(D): CPT | Performed by: OBSTETRICS & GYNECOLOGY

## 2017-07-27 PROCEDURE — 3E0R3CZ INTRODUCTION OF REGIONAL ANESTHETIC INTO SPINAL CANAL, PERCUTANEOUS APPROACH: ICD-10-PCS | Performed by: ANESTHESIOLOGY

## 2017-07-27 PROCEDURE — 37000011 ZZH ANESTHESIA WARD SERVICE

## 2017-07-27 PROCEDURE — 27211215 ZZ H OR FILTER BLOOD TRANS 40 MICRON SQ40S: Performed by: OBSTETRICS & GYNECOLOGY

## 2017-07-27 PROCEDURE — 25000132 ZZH RX MED GY IP 250 OP 250 PS 637: Performed by: OBSTETRICS & GYNECOLOGY

## 2017-07-27 PROCEDURE — 37000009 ZZH ANESTHESIA TECHNICAL FEE, EACH ADDTL 15 MIN: Performed by: OBSTETRICS & GYNECOLOGY

## 2017-07-27 PROCEDURE — 00HU33Z INSERTION OF INFUSION DEVICE INTO SPINAL CANAL, PERCUTANEOUS APPROACH: ICD-10-PCS | Performed by: ANESTHESIOLOGY

## 2017-07-27 PROCEDURE — 40000671 ZZH STATISTIC ANESTHESIA CASE

## 2017-07-27 RX ORDER — NALOXONE HYDROCHLORIDE 0.4 MG/ML
.1-.4 INJECTION, SOLUTION INTRAMUSCULAR; INTRAVENOUS; SUBCUTANEOUS
Status: DISCONTINUED | OUTPATIENT
Start: 2017-07-27 | End: 2017-07-27

## 2017-07-27 RX ORDER — OXYCODONE HYDROCHLORIDE 5 MG/1
5-10 TABLET ORAL
Status: DISCONTINUED | OUTPATIENT
Start: 2017-07-27 | End: 2017-07-30 | Stop reason: HOSPADM

## 2017-07-27 RX ORDER — NALOXONE HYDROCHLORIDE 0.4 MG/ML
.1-.4 INJECTION, SOLUTION INTRAMUSCULAR; INTRAVENOUS; SUBCUTANEOUS
Status: DISCONTINUED | OUTPATIENT
Start: 2017-07-27 | End: 2017-07-30 | Stop reason: HOSPADM

## 2017-07-27 RX ORDER — ONDANSETRON 2 MG/ML
4 INJECTION INTRAMUSCULAR; INTRAVENOUS EVERY 6 HOURS PRN
Status: DISCONTINUED | OUTPATIENT
Start: 2017-07-27 | End: 2017-07-27

## 2017-07-27 RX ORDER — SIMETHICONE 80 MG
80 TABLET,CHEWABLE ORAL 4 TIMES DAILY PRN
Status: DISCONTINUED | OUTPATIENT
Start: 2017-07-27 | End: 2017-07-30 | Stop reason: HOSPADM

## 2017-07-27 RX ORDER — ACETAMINOPHEN 325 MG/1
650 TABLET ORAL EVERY 4 HOURS PRN
Status: DISCONTINUED | OUTPATIENT
Start: 2017-07-27 | End: 2017-07-27

## 2017-07-27 RX ORDER — LIDOCAINE 40 MG/G
CREAM TOPICAL
Status: DISCONTINUED | OUTPATIENT
Start: 2017-07-27 | End: 2017-07-27

## 2017-07-27 RX ORDER — ONDANSETRON 2 MG/ML
4 INJECTION INTRAMUSCULAR; INTRAVENOUS EVERY 6 HOURS PRN
Status: DISCONTINUED | OUTPATIENT
Start: 2017-07-27 | End: 2017-07-28

## 2017-07-27 RX ORDER — OXYTOCIN 10 [USP'U]/ML
10 INJECTION, SOLUTION INTRAMUSCULAR; INTRAVENOUS
Status: DISCONTINUED | OUTPATIENT
Start: 2017-07-27 | End: 2017-07-30 | Stop reason: HOSPADM

## 2017-07-27 RX ORDER — DIPHENHYDRAMINE HYDROCHLORIDE 50 MG/ML
25 INJECTION INTRAMUSCULAR; INTRAVENOUS EVERY 6 HOURS PRN
Status: DISCONTINUED | OUTPATIENT
Start: 2017-07-27 | End: 2017-07-30 | Stop reason: HOSPADM

## 2017-07-27 RX ORDER — LANOLIN 100 %
OINTMENT (GRAM) TOPICAL
Status: DISCONTINUED | OUTPATIENT
Start: 2017-07-27 | End: 2017-07-30 | Stop reason: HOSPADM

## 2017-07-27 RX ORDER — DIPHENHYDRAMINE HYDROCHLORIDE 50 MG/ML
INJECTION INTRAMUSCULAR; INTRAVENOUS PRN
Status: DISCONTINUED | OUTPATIENT
Start: 2017-07-27 | End: 2017-07-27

## 2017-07-27 RX ORDER — ONDANSETRON 4 MG/1
4 TABLET, ORALLY DISINTEGRATING ORAL EVERY 6 HOURS PRN
Status: DISCONTINUED | OUTPATIENT
Start: 2017-07-27 | End: 2017-07-27

## 2017-07-27 RX ORDER — PROPOFOL 10 MG/ML
INJECTION, EMULSION INTRAVENOUS PRN
Status: DISCONTINUED | OUTPATIENT
Start: 2017-07-27 | End: 2017-07-27

## 2017-07-27 RX ORDER — EPHEDRINE SULFATE 50 MG/ML
5 INJECTION, SOLUTION INTRAMUSCULAR; INTRAVENOUS; SUBCUTANEOUS
Status: DISCONTINUED | OUTPATIENT
Start: 2017-07-27 | End: 2017-07-28

## 2017-07-27 RX ORDER — NALBUPHINE HYDROCHLORIDE 10 MG/ML
2.5-5 INJECTION, SOLUTION INTRAMUSCULAR; INTRAVENOUS; SUBCUTANEOUS EVERY 6 HOURS PRN
Status: DISCONTINUED | OUTPATIENT
Start: 2017-07-27 | End: 2017-07-27

## 2017-07-27 RX ORDER — MORPHINE SULFATE 1 MG/ML
INJECTION, SOLUTION EPIDURAL; INTRATHECAL; INTRAVENOUS PRN
Status: DISCONTINUED | OUTPATIENT
Start: 2017-07-27 | End: 2017-07-27

## 2017-07-27 RX ORDER — ONDANSETRON 2 MG/ML
4 INJECTION INTRAMUSCULAR; INTRAVENOUS EVERY 6 HOURS PRN
Status: DISCONTINUED | OUTPATIENT
Start: 2017-07-27 | End: 2017-07-30 | Stop reason: HOSPADM

## 2017-07-27 RX ORDER — IBUPROFEN 800 MG/1
800 TABLET, FILM COATED ORAL
Status: DISCONTINUED | OUTPATIENT
Start: 2017-07-27 | End: 2017-07-27

## 2017-07-27 RX ORDER — AMOXICILLIN 250 MG
1-2 CAPSULE ORAL 2 TIMES DAILY
Status: DISCONTINUED | OUTPATIENT
Start: 2017-07-27 | End: 2017-07-28

## 2017-07-27 RX ORDER — OXYTOCIN/0.9 % SODIUM CHLORIDE 30/500 ML
100-340 PLASTIC BAG, INJECTION (ML) INTRAVENOUS CONTINUOUS PRN
Status: DISCONTINUED | OUTPATIENT
Start: 2017-07-27 | End: 2017-07-27

## 2017-07-27 RX ORDER — OXYTOCIN/0.9 % SODIUM CHLORIDE 30/500 ML
340 PLASTIC BAG, INJECTION (ML) INTRAVENOUS CONTINUOUS PRN
Status: DISCONTINUED | OUTPATIENT
Start: 2017-07-27 | End: 2017-07-30 | Stop reason: HOSPADM

## 2017-07-27 RX ORDER — HYDROCORTISONE 2.5 %
CREAM (GRAM) TOPICAL 3 TIMES DAILY PRN
Status: DISCONTINUED | OUTPATIENT
Start: 2017-07-27 | End: 2017-07-30 | Stop reason: HOSPADM

## 2017-07-27 RX ORDER — CEFAZOLIN SODIUM 1 G/3ML
INJECTION, POWDER, FOR SOLUTION INTRAMUSCULAR; INTRAVENOUS
Status: DISCONTINUED
Start: 2017-07-27 | End: 2017-07-27 | Stop reason: HOSPADM

## 2017-07-27 RX ORDER — NALOXONE HYDROCHLORIDE 0.4 MG/ML
.1-.4 INJECTION, SOLUTION INTRAMUSCULAR; INTRAVENOUS; SUBCUTANEOUS
Status: DISCONTINUED | OUTPATIENT
Start: 2017-07-27 | End: 2017-07-28

## 2017-07-27 RX ORDER — LIDOCAINE 40 MG/G
CREAM TOPICAL
Status: DISCONTINUED | OUTPATIENT
Start: 2017-07-27 | End: 2017-07-30 | Stop reason: HOSPADM

## 2017-07-27 RX ORDER — DIPHENHYDRAMINE HCL 25 MG
25 CAPSULE ORAL EVERY 6 HOURS PRN
Status: DISCONTINUED | OUTPATIENT
Start: 2017-07-27 | End: 2017-07-30 | Stop reason: HOSPADM

## 2017-07-27 RX ORDER — ACETAMINOPHEN 325 MG/1
975 TABLET ORAL EVERY 8 HOURS
Status: COMPLETED | OUTPATIENT
Start: 2017-07-27 | End: 2017-07-30

## 2017-07-27 RX ORDER — FENTANYL CITRATE 50 UG/ML
INJECTION, SOLUTION INTRAMUSCULAR; INTRAVENOUS PRN
Status: DISCONTINUED | OUTPATIENT
Start: 2017-07-27 | End: 2017-07-27

## 2017-07-27 RX ORDER — KETOROLAC TROMETHAMINE 30 MG/ML
30 INJECTION, SOLUTION INTRAMUSCULAR; INTRAVENOUS EVERY 6 HOURS
Status: DISCONTINUED | OUTPATIENT
Start: 2017-07-27 | End: 2017-07-28

## 2017-07-27 RX ORDER — ACETAMINOPHEN 325 MG/1
650 TABLET ORAL EVERY 4 HOURS PRN
Status: DISCONTINUED | OUTPATIENT
Start: 2017-07-30 | End: 2017-07-30 | Stop reason: HOSPADM

## 2017-07-27 RX ORDER — MORPHINE SULFATE 1 MG/ML
INJECTION, SOLUTION EPIDURAL; INTRATHECAL; INTRAVENOUS
Status: DISCONTINUED
Start: 2017-07-27 | End: 2017-07-27 | Stop reason: HOSPADM

## 2017-07-27 RX ORDER — DEXAMETHASONE SODIUM PHOSPHATE 4 MG/ML
INJECTION, SOLUTION INTRA-ARTICULAR; INTRALESIONAL; INTRAMUSCULAR; INTRAVENOUS; SOFT TISSUE
Status: DISCONTINUED
Start: 2017-07-27 | End: 2017-07-27 | Stop reason: HOSPADM

## 2017-07-27 RX ORDER — SODIUM CHLORIDE 9 MG/ML
INJECTION, SOLUTION INTRAVENOUS CONTINUOUS PRN
Status: DISCONTINUED | OUTPATIENT
Start: 2017-07-27 | End: 2017-07-27

## 2017-07-27 RX ORDER — IBUPROFEN 400 MG/1
400-800 TABLET, FILM COATED ORAL EVERY 6 HOURS PRN
Status: DISCONTINUED | OUTPATIENT
Start: 2017-07-27 | End: 2017-07-30 | Stop reason: HOSPADM

## 2017-07-27 RX ORDER — CARBOPROST TROMETHAMINE 250 UG/ML
250 INJECTION, SOLUTION INTRAMUSCULAR
Status: DISCONTINUED | OUTPATIENT
Start: 2017-07-27 | End: 2017-07-27

## 2017-07-27 RX ORDER — BISACODYL 10 MG
10 SUPPOSITORY, RECTAL RECTAL DAILY PRN
Status: DISCONTINUED | OUTPATIENT
Start: 2017-07-29 | End: 2017-07-30 | Stop reason: HOSPADM

## 2017-07-27 RX ORDER — DIPHENHYDRAMINE HYDROCHLORIDE 50 MG/ML
INJECTION INTRAMUSCULAR; INTRAVENOUS
Status: DISCONTINUED
Start: 2017-07-27 | End: 2017-07-27 | Stop reason: HOSPADM

## 2017-07-27 RX ORDER — DEXTROSE, SODIUM CHLORIDE, SODIUM LACTATE, POTASSIUM CHLORIDE, AND CALCIUM CHLORIDE 5; .6; .31; .03; .02 G/100ML; G/100ML; G/100ML; G/100ML; G/100ML
INJECTION, SOLUTION INTRAVENOUS CONTINUOUS
Status: DISCONTINUED | OUTPATIENT
Start: 2017-07-27 | End: 2017-07-28

## 2017-07-27 RX ORDER — DEXAMETHASONE SODIUM PHOSPHATE 4 MG/ML
INJECTION, SOLUTION INTRA-ARTICULAR; INTRALESIONAL; INTRAMUSCULAR; INTRAVENOUS; SOFT TISSUE PRN
Status: DISCONTINUED | OUTPATIENT
Start: 2017-07-27 | End: 2017-07-27

## 2017-07-27 RX ORDER — CEFAZOLIN SODIUM 1 G/3ML
INJECTION, POWDER, FOR SOLUTION INTRAMUSCULAR; INTRAVENOUS PRN
Status: DISCONTINUED | OUTPATIENT
Start: 2017-07-27 | End: 2017-07-27

## 2017-07-27 RX ORDER — FENTANYL CITRATE 50 UG/ML
INJECTION, SOLUTION INTRAMUSCULAR; INTRAVENOUS
Status: DISCONTINUED
Start: 2017-07-27 | End: 2017-07-27 | Stop reason: HOSPADM

## 2017-07-27 RX ORDER — HYDROMORPHONE HYDROCHLORIDE 1 MG/ML
.3-.5 INJECTION, SOLUTION INTRAMUSCULAR; INTRAVENOUS; SUBCUTANEOUS EVERY 30 MIN PRN
Status: DISCONTINUED | OUTPATIENT
Start: 2017-07-27 | End: 2017-07-28

## 2017-07-27 RX ORDER — ONDANSETRON 4 MG/1
4 TABLET, ORALLY DISINTEGRATING ORAL EVERY 6 HOURS PRN
Status: DISCONTINUED | OUTPATIENT
Start: 2017-07-27 | End: 2017-07-28

## 2017-07-27 RX ORDER — SODIUM CHLORIDE, SODIUM LACTATE, POTASSIUM CHLORIDE, CALCIUM CHLORIDE 600; 310; 30; 20 MG/100ML; MG/100ML; MG/100ML; MG/100ML
INJECTION, SOLUTION INTRAVENOUS CONTINUOUS
Status: DISCONTINUED | OUTPATIENT
Start: 2017-07-27 | End: 2017-07-27

## 2017-07-27 RX ORDER — MISOPROSTOL 200 UG/1
400 TABLET ORAL
Status: DISCONTINUED | OUTPATIENT
Start: 2017-07-27 | End: 2017-07-30 | Stop reason: HOSPADM

## 2017-07-27 RX ORDER — LIDOCAINE HYDROCHLORIDE AND EPINEPHRINE 15; 5 MG/ML; UG/ML
3 INJECTION, SOLUTION EPIDURAL
Status: COMPLETED | OUTPATIENT
Start: 2017-07-27 | End: 2017-07-27

## 2017-07-27 RX ORDER — OXYTOCIN/0.9 % SODIUM CHLORIDE 30/500 ML
100 PLASTIC BAG, INJECTION (ML) INTRAVENOUS CONTINUOUS
Status: DISCONTINUED | OUTPATIENT
Start: 2017-07-27 | End: 2017-07-30 | Stop reason: HOSPADM

## 2017-07-27 RX ORDER — ROPIVACAINE HYDROCHLORIDE 2 MG/ML
10 INJECTION, SOLUTION EPIDURAL; INFILTRATION; PERINEURAL ONCE
Status: COMPLETED | OUTPATIENT
Start: 2017-07-27 | End: 2017-07-27

## 2017-07-27 RX ORDER — OXYTOCIN 10 [USP'U]/ML
10 INJECTION, SOLUTION INTRAMUSCULAR; INTRAVENOUS
Status: DISCONTINUED | OUTPATIENT
Start: 2017-07-27 | End: 2017-07-27

## 2017-07-27 RX ORDER — OXYCODONE AND ACETAMINOPHEN 5; 325 MG/1; MG/1
1 TABLET ORAL
Status: DISCONTINUED | OUTPATIENT
Start: 2017-07-27 | End: 2017-07-27

## 2017-07-27 RX ORDER — METHYLERGONOVINE MALEATE 0.2 MG/ML
200 INJECTION INTRAVENOUS
Status: DISCONTINUED | OUTPATIENT
Start: 2017-07-27 | End: 2017-07-27

## 2017-07-27 RX ADMIN — SUCCINYLCHOLINE CHLORIDE 100 MG: 20 INJECTION, SOLUTION INTRAMUSCULAR; INTRAVENOUS at 07:52

## 2017-07-27 RX ADMIN — LIDOCAINE HYDROCHLORIDE AND EPINEPHRINE 3 ML: 15; 5 INJECTION, SOLUTION EPIDURAL at 07:04

## 2017-07-27 RX ADMIN — KETOROLAC TROMETHAMINE 30 MG: 30 INJECTION, SOLUTION INTRAMUSCULAR at 21:09

## 2017-07-27 RX ADMIN — DIPHENHYDRAMINE HYDROCHLORIDE 25 MG: 50 INJECTION, SOLUTION INTRAMUSCULAR; INTRAVENOUS at 08:12

## 2017-07-27 RX ADMIN — CEFAZOLIN 3 G: 1 INJECTION, POWDER, FOR SOLUTION INTRAMUSCULAR; INTRAVENOUS at 08:12

## 2017-07-27 RX ADMIN — OXYTOCIN-SODIUM CHLORIDE 0.9% IV SOLN 30 UNIT/500ML 100 ML/HR: 30-0.9/5 SOLUTION at 12:40

## 2017-07-27 RX ADMIN — DEXAMETHASONE SODIUM PHOSPHATE 4 MG: 4 INJECTION, SOLUTION INTRA-ARTICULAR; INTRALESIONAL; INTRAMUSCULAR; INTRAVENOUS; SOFT TISSUE at 08:55

## 2017-07-27 RX ADMIN — SODIUM CHLORIDE, POTASSIUM CHLORIDE, SODIUM LACTATE AND CALCIUM CHLORIDE: 600; 310; 30; 20 INJECTION, SOLUTION INTRAVENOUS at 07:11

## 2017-07-27 RX ADMIN — SODIUM CHLORIDE, POTASSIUM CHLORIDE, SODIUM LACTATE AND CALCIUM CHLORIDE: 600; 310; 30; 20 INJECTION, SOLUTION INTRAVENOUS at 08:28

## 2017-07-27 RX ADMIN — ONDANSETRON 4 MG: 2 INJECTION INTRAMUSCULAR; INTRAVENOUS at 08:04

## 2017-07-27 RX ADMIN — ROPIVACAINE HYDROCHLORIDE 10 ML: 2 INJECTION, SOLUTION EPIDURAL; INFILTRATION at 07:08

## 2017-07-27 RX ADMIN — KETOROLAC TROMETHAMINE 30 MG: 30 INJECTION, SOLUTION INTRAMUSCULAR at 15:37

## 2017-07-27 RX ADMIN — DIPHENHYDRAMINE HYDROCHLORIDE 25 MG: 25 CAPSULE ORAL at 15:57

## 2017-07-27 RX ADMIN — Medication 340 ML/HR: at 07:57

## 2017-07-27 RX ADMIN — OXYTOCIN-SODIUM CHLORIDE 0.9% IV SOLN 30 UNIT/500ML 100 ML/HR: 30-0.9/5 SOLUTION at 10:49

## 2017-07-27 RX ADMIN — SODIUM CHLORIDE: 9 INJECTION, SOLUTION INTRAVENOUS at 08:04

## 2017-07-27 RX ADMIN — DIPHENHYDRAMINE HYDROCHLORIDE 25 MG: 25 CAPSULE ORAL at 23:55

## 2017-07-27 RX ADMIN — ACETAMINOPHEN 975 MG: 325 TABLET, FILM COATED ORAL at 21:08

## 2017-07-27 RX ADMIN — PROPOFOL 200 MG: 10 INJECTION, EMULSION INTRAVENOUS at 07:52

## 2017-07-27 RX ADMIN — MORPHINE SULFATE 2 MG: 1 INJECTION EPIDURAL; INTRATHECAL; INTRAVENOUS at 08:15

## 2017-07-27 RX ADMIN — KETOROLAC TROMETHAMINE 30 MG: 30 INJECTION, SOLUTION INTRAMUSCULAR at 09:58

## 2017-07-27 RX ADMIN — ACETAMINOPHEN 975 MG: 325 TABLET, FILM COATED ORAL at 08:00

## 2017-07-27 RX ADMIN — Medication 12 ML/HR: at 07:10

## 2017-07-27 RX ADMIN — FENTANYL CITRATE 25 MCG: 50 INJECTION, SOLUTION INTRAMUSCULAR; INTRAVENOUS at 08:40

## 2017-07-27 RX ADMIN — SODIUM CHLORIDE, POTASSIUM CHLORIDE, SODIUM LACTATE AND CALCIUM CHLORIDE 1000 ML: 600; 310; 30; 20 INJECTION, SOLUTION INTRAVENOUS at 06:45

## 2017-07-27 RX ADMIN — FENTANYL CITRATE 25 MCG: 50 INJECTION, SOLUTION INTRAMUSCULAR; INTRAVENOUS at 08:32

## 2017-07-27 RX ADMIN — SODIUM CHLORIDE, SODIUM LACTATE, POTASSIUM CHLORIDE, CALCIUM CHLORIDE AND DEXTROSE MONOHYDRATE 1000 ML: 5; 600; 310; 30; 20 INJECTION, SOLUTION INTRAVENOUS at 18:31

## 2017-07-27 NOTE — OP NOTE
Surgeon / Clinician: Linda Meraz MD    PREOPERATIVE DIAGNOSES:    1.  Dichorionic/diamniotic intrauterine pregnancy at 37 and 0.  2.  Cord prolapse that resolved.  3.  Asynclitic presentation.  4.  Minimal descent with 1 pull of the vacuum.    POSTOPERATIVE DIAGNOSES:    1.  Dichorionic/diamniotic intrauterine pregnancy at 37 and 0.  2.  Cord prolapse that resolved.  3.  Asynclitic presentation.  4.  Minimal descent with 1 pull of the vacuum.    Procedures:  Primary low transverse  section and use of Cell Saver.    SURGEONS:  Linda Meraz MD.    ASSISTANTS:  Harleen Dozier.    ANESTHETIC:  Epidural and general.    ESTIMATED BLOOD LOSS:  1000 cc.    Specimen:  Cord gas on baby A and both placentas.    FINDINGS:  A male infant in asynclitic presentation with cord prolapse that resolved.  Vertex female.  Normal tubes, ovaries, and placenta were noted.    INDICATIONS:  The patient is a 36-year-old, G2, P1-0-0-1, at 37 and 0/7 weeks who has been inpatient for greater than 1 month for advanced cervical dilation and remote living from the hospital.  The morning of 37 and 0 weeks, we had planned for induction, however, she began actively loki on her own.  When I did check, she was vertex A and B was transverse with the vertex in the maternal right.  Decision was made to head back to the operating room with assisted rupture of membranes.  There was cord prolapse with maternal positioning of ___ position.  The cord did ascend above the head and was no longer palpable with normal fetal tones.  At that point, decision was made and verbal consent was obtained for an attempted descent of the vertex with Kiwi vacuum extractor.  The vertex was found to be in NUHA presentation.  The Kiwi was placed into the green zone and traction was placed.  Attempted pull x1 with descent to +1 to +2 station.  With removal of the Kiwi, the vertex became asynclitic and the ear was then presenting with now vertex back to -2.  Decision  point of that was made for urgent  section.  She had been given risks, benefits, and alternatives prior to the delivery and had signed informed consent.      PROCEDURE IN DETAIL:  The patient was brought to the operating room, where general endotracheal anesthesia was placed.  She had received a prior epidural.  A low transverse skin incision was made.  It was carried down to the underlying fascial layer, which was scored with Bovie electrocautery, stretched bilaterally.  The peritoneal cavity was bluntly entered.  The hysterotomy was begun sharply and was entered bluntly with finger fracture bilaterally.  The fetal vertex delivered atraumatically and a delayed cord clamp was performed.  The cord was then clamped by ourselves and cut and handed over to the nursery staff.  At this point, baby B had restituted itself into the fetal vertex presentation, brought himself to the hysterotomy site and an amniotomy was performed and the vertex delivered atraumatically.  Again, a delayed clamp of 1 minute was performed.  The cord was then clamped and cut and brought to the NICU staff.  The placenta delivered spontaneously, Schultze presentation, intact with a 3-vessel cord and the uterus was exteriorized.  It was wiped of all placental membrane fragments.  The hysterotomy was closed with a running lock suture of 0 Monocryl and the second horizontal imbricating suture of 0 Monocryl.  The underlying tissue layers were made hemostatic and the fascia layer was closed with a running suture found to be without palpable defect and all underlying tissue again was irrigated with a moist lap and was found to be hemostatic.  The skin was closed with Insorb staples and dressed with a Tegaderm dressing.  Total estimated blood loss was 1000 cc.  200 cc of Cell Saver blood was returned and she will be transferred to PACU in stable condition after receiving x-ray for instrument count.        Linda Meraz MD    D:  2017  09:31 T:  07/27/2017 10:46  Document:  [id number]\TC

## 2017-07-27 NOTE — PLAN OF CARE
Dr. Kidd to bedside at 0645 for labor epidural.  Patient assisted to sitting on side of bed.  Time out done.  Test dose given.  RN at bedside continuous, serial BP started, and continuous pulse oximeter on.  Will continue to monitor and update as needed.

## 2017-07-27 NOTE — ANESTHESIA POSTPROCEDURE EVALUATION
Patient: Katie ANGUIANO Akinyede    * No procedures listed *    Diagnosis:* No pre-op diagnosis entered *  Diagnosis Additional Information: No value filed.    Anesthesia Type:  No value filed.    Note:  Anesthesia Post Evaluation    Patient location during evaluation: Bedside  Patient participation: Able to fully participate in evaluation  Level of consciousness: awake     Anesthetic complications: None    Comments: Epidural removed, tip intact        Last vitals:  Vitals:    07/27/17 1150 07/27/17 1217 07/27/17 1323   BP: 113/69 115/65 116/65   Pulse:      Resp: 18 18 18   Temp:      SpO2: 98% 97% 99%         Electronically Signed By: TIMOTHY FLETCHER  July 27, 2017  1:53 PM

## 2017-07-27 NOTE — ANESTHESIA PREPROCEDURE EVALUATION
Anesthesia Evaluation     .             ROS/MED HX    ENT/Pulmonary:     (+)asthma , . .    Neurologic:       Cardiovascular:         METS/Exercise Tolerance:     Hematologic:         Musculoskeletal:         GI/Hepatic:         Renal/Genitourinary:         Endo:         Psychiatric:         Infectious Disease:         Malignancy:         Other:                     Physical Exam      Airway     Dental     Cardiovascular       Pulmonary     Other findings: Twins                Anesthesia Plan      History & Physical Review      ASA Status:  .  OB Epidural Asa: 2            Postoperative Care      Consents                          .

## 2017-07-27 NOTE — ANESTHESIA PREPROCEDURE EVALUATION
Anesthesia Evaluation     .             ROS/MED HX    ENT/Pulmonary:     (+)asthma , . .   (-) sleep apnea   Neurologic:       Cardiovascular:         METS/Exercise Tolerance:     Hematologic:         Musculoskeletal:         GI/Hepatic:        (-) GERD   Renal/Genitourinary:         Endo:         Psychiatric:         Infectious Disease:         Malignancy:         Other:                     Physical Exam  Normal systems: cardiovascular and pulmonary    Airway   Mallampati: II    Dental     Cardiovascular       Pulmonary                     Anesthesia Plan      History & Physical Review  History and physical reviewed and following examination; no interval change.    ASA Status:  2 emergent.        Plan for General, ETT and RSI   PONV prophylaxis:  Ondansetron (or other 5HT-3)  Additional equipment: Videolaryngoscope      Postoperative Care  Postoperative pain management:  IV analgesics and Neuraxial analgesia.      Consents  Anesthetic plan, risks, benefits and alternatives discussed with:  Patient..                          .

## 2017-07-27 NOTE — BRIEF OP NOTE
[facility]  Gynecology Brief Operative Note    Pre-operative diagnosis: Primary  section  Failed vacuum  Cord prolapse and asynclitic presentation.    Post-operative diagnosis Same   Procedure: Primary  section, cell saver   Surgeon: Linda Meraz MD   Assistants(s): Dr. Tiago Dozier   Anesthesia: Epidural and general   Estimated blood loss: 1000 cc    Specimens: Cord gas on A, both placentas   Findings: Male in asynclitic presentation, cord prolapse, vertex female. Normal tubes, ovaries and placenta.    Complications: See Op note   Comments: See dictated operative report for full details       Linda Meraz

## 2017-07-27 NOTE — ANESTHESIA CARE TRANSFER NOTE
Patient: Katie Soto    * No procedures listed *    Diagnosis: * No pre-op diagnosis entered *  Diagnosis Additional Information: No value filed.    Anesthesia Type:   General, ETT, RSI     Note:  Airway :Room Air  Patient transferred to:PACU  Comments: Transferred to main PACU recovery, spontaneous respirations on nasal cannula with oxygen saturations maintained greater than 98%. SpO2, NiBP, and EKG monitors and alarms on and functioning, report on patient's clinical status given to OB recovery RN, RN questions answered, patient in hospital bed with siderails up, [place] Hugger warmer connected to patient gown, Oxygen tubing connected to wall O2 in OB PACU Oxytocin 30 units in 500mL infusion connected to IV infusion pump in recovery bay and programmed to 100 mL/hr at handoff of care.      Vitals: (Last set prior to Anesthesia Care Transfer)    CRNA VITALS  7/27/2017 0832 - 7/27/2017 0920      7/27/2017             Pulse: 61    SpO2: 93 %    Resp Rate (set): 10                Electronically Signed By: JAMEY Razo CRNA  July 27, 2017  9:17 AM

## 2017-07-27 NOTE — H&P
"Pre op H and P    S: Pt is here with Twins, advanced cercical dilation. Now with regular contractions. Plans on vaginal delivery with vaginal breech extraction.     Past OB Hx:  1. Vaginal delivery of 7\"8 oz with minimal pushing and no complications    Past GYN Hx:   1. Ovarian cysts    Past surgical history:  1. Laprascopy x 2 for ovarian cyst.     Social History:  , works outside the home. No d/a/t.     Medical History:  1. Obesity  2. JW- does not accept blood products under any terms.     ROS:   Pelvic discomfort and pressure, occasional contractions    O:  /59  Pulse 67  Temp 98.2  F (36.8  C)  Resp 18  Ht 1.676 m (5' 6\")  Wt 113.7 kg (250 lb 12 oz)  LMP 11/10/2016  BMI 40.47 kg/m2  Card: RRR  Chest: CTA B  Abdomen: Gravid. US shows Vertex and transverse.   SVE: 10/100/-2  TOCO: Q4-6 minutes    A/P: 37 yo  at 37+0/7 wks. Twin vertex/transverse presentation. Labor.   1. Anticipate vaginal delivery. Discussed risks of twin delivery as well as vaginal breech extraction. Accepts risks.   2. Cell saver is available if conversion to c/s.   3. Discussed increase risks of PPH and possibility of hysterectomy.     Linda Meraz    "

## 2017-07-27 NOTE — ANESTHESIA POSTPROCEDURE EVALUATION
Patient: Katie ANGUIANO Akinyede    Procedure(s):   - Wound Class: II-Clean Contaminated    Diagnosis:twin pregnancy  Diagnosis Additional Information: No value filed.    Anesthesia Type:  General, ETT, RSI    Note:  Anesthesia Post Evaluation    Patient location during evaluation: PACU  Patient participation: Able to fully participate in evaluation  Level of consciousness: awake  Pain management: adequate  Cardiovascular status: acceptable  Respiratory status: acceptable  Hydration status: acceptable  PONV: none     Anesthetic complications: None          Last vitals:  Vitals:    07/27/17 1430 07/27/17 1500 07/27/17 1530   BP: 116/67  107/60   Pulse:      Resp: 18 18 18   Temp:   36.9  C (98.4  F)   SpO2: 99% 99% 99%         Electronically Signed By: TIMOTHY FLETCHER  July 27, 2017  5:28 PM

## 2017-07-27 NOTE — PLAN OF CARE
Problem: Postpartum ( Delivery) (Adult)  Goal: Signs and Symptoms of Listed Potential Problems Will be Absent or Manageable (Postpartum)  Signs and symptoms of listed potential problems will be absent or manageable by discharge/transition of care (reference Postpartum ( Delivery) (Adult) CPG).   Outcome: No Change  Pt. arrived on floor at 1015, IV Pitocin infusing. VSS, Fundus firm, pt. did have small to mod. amt. of free flow bldg. with two fundal checks, no clots. Lester draining clear, scottie urine. Tegaderm drsg. over inc. Taking ice chips and water. Did have an emesis this afternoon. Breastfeeding babies with assist. Denies pain, had Toradol and scheduled Tylenol. Pt. assisted up to side of bed and stood with the assist of two RN's. Did feel dizzy but was able to stand up and trae care was done. Pads weighed for a total of 200ml.

## 2017-07-27 NOTE — PLAN OF CARE
0700 - RN to OB OR#1 to verify OR readiness.  OR ready for patient to transport back for vaginal delivery of twins.    0710 - Report received from KETURAH Cotton, LAVINIA.  All cares assumed.  Patient ready for transport to OB OR #1.  Patient transported to OB OR #1 via bed with  at her side.    0720 - Arrival to Ob OR #1.  Monitors applied.  Awaiting arrival of Dr. Meraz to prep patient to push.    0730 - Dr. Meraz and Dr. Dozier in OB OR #1.  Patient set up to start pushing.      0735 - AROM by Dr. Meraz of Baby A.  Large amount clear fluid noted.  Cord prolapse noted by MD.  Patient positioned to be prepped for code c/s.      0740 - Upon placing patient supine, per MD, the cord migrated behind baby's head.  Orders received to start pushing.      0745 - Kiwi placed by Dr. Meraz during pushing to facilitate delivery with patient's verbal consent.  2 pulls noted lasting 40 seconds each, no pop-offs.    0747 - No progress made with vacuum extraction per MD.  Orders received to prep for c/s.  Begin C/S prep.  See surgery procedure checklist for further details.

## 2017-07-27 NOTE — ANESTHESIA PROCEDURE NOTES
Peripheral nerve/Neuraxial procedure note : epidural catheter  Pre-Procedure  Performed by LOI GUY  Location: OB      Pre-Anesthestic Checklist: patient identified, IV checked, risks and benefits discussed, informed consent, monitors and equipment checked, pre-op evaluation and at physician/surgeon's request    Timeout  Correct Patient: Yes   Correct Procedure: Yes   Correct Site: Yes   Correct Laterality: N/A   Correct Position: Yes   Site Marked: N/A   .   Procedure Documentation    .    Procedure:    Epidural catheter.  Insertion Site:L4-5  (midline approach) Injection technique: LORT saline   Local skin infiltrated with mL of 1% lidocaine.  DENI at 7 cm     Patient Prep;mask, sterile gloves, povidone-iodine 7.5% surgical scrub, patient draped.  .  Needle: Touhy needle Needle Gauge: 17.    Needle Length (Inches) 3.5  # of attempts: 1 and # of redirects:  .   . .  Catheter threaded easily  .  12 cm at skin.   .    Assessment/Narrative  Paresthesias: No.  .  .  Aspiration negative for heme or CSF  . Test dose of 3 mL lidocaine 1.5% w/ 1:200,000 epinephrine at. Test dose negative for signs of intravascular, subdural or intrathecal injection. Comments:  No complications.  Catheter secured with sterile dressing and tape.  Questions answered.

## 2017-07-27 NOTE — PROVIDER NOTIFICATION
Called to patients room around 0300.  Patient stated she was just feeling a little different.  Used the bathroom and then put on the monitor.  Patient reports feeling shaky and nauseous. SVE performed.  7/80/-2.  Dr. Meraz unable to be reached, Dr. Mcgovern (on-call) called in.    Dr. Olson called back at 0530 and was at bedside by 0600.  Patient got epidural (See note).  Report given to Marianne MORA And patient moved back to the OR at 0710.

## 2017-07-28 LAB
COPATH REPORT: NORMAL
HGB BLD-MCNC: 9.1 G/DL (ref 11.7–15.7)
T PALLIDUM IGG+IGM SER QL: NEGATIVE

## 2017-07-28 PROCEDURE — 25000132 ZZH RX MED GY IP 250 OP 250 PS 637: Performed by: OBSTETRICS & GYNECOLOGY

## 2017-07-28 PROCEDURE — 85018 HEMOGLOBIN: CPT | Performed by: OBSTETRICS & GYNECOLOGY

## 2017-07-28 PROCEDURE — 12000037 ZZH R&B POSTPARTUM INTERMEDIATE

## 2017-07-28 PROCEDURE — 36415 COLL VENOUS BLD VENIPUNCTURE: CPT | Performed by: OBSTETRICS & GYNECOLOGY

## 2017-07-28 RX ORDER — BREAST PUMP
1 EACH MISCELLANEOUS ONCE
Qty: 1 EACH | Refills: 0 | Status: SHIPPED | OUTPATIENT
Start: 2017-07-28 | End: 2017-07-28

## 2017-07-28 RX ORDER — POLYETHYLENE GLYCOL 3350 17 G/17G
17 POWDER, FOR SOLUTION ORAL DAILY
Status: DISCONTINUED | OUTPATIENT
Start: 2017-07-28 | End: 2017-07-30 | Stop reason: HOSPADM

## 2017-07-28 RX ADMIN — IBUPROFEN 800 MG: 400 TABLET ORAL at 03:09

## 2017-07-28 RX ADMIN — ACETAMINOPHEN 975 MG: 325 TABLET, FILM COATED ORAL at 12:45

## 2017-07-28 RX ADMIN — DIPHENHYDRAMINE HYDROCHLORIDE 25 MG: 25 CAPSULE ORAL at 05:45

## 2017-07-28 RX ADMIN — IBUPROFEN 800 MG: 400 TABLET ORAL at 14:38

## 2017-07-28 RX ADMIN — Medication 1 LOZENGE: at 23:47

## 2017-07-28 RX ADMIN — POLYETHYLENE GLYCOL 3350 17 G: 17 POWDER, FOR SOLUTION ORAL at 08:52

## 2017-07-28 RX ADMIN — ACETAMINOPHEN 975 MG: 325 TABLET, FILM COATED ORAL at 21:22

## 2017-07-28 RX ADMIN — IBUPROFEN 800 MG: 400 TABLET ORAL at 23:14

## 2017-07-28 RX ADMIN — ACETAMINOPHEN 975 MG: 325 TABLET, FILM COATED ORAL at 05:07

## 2017-07-28 RX ADMIN — IBUPROFEN 800 MG: 400 TABLET ORAL at 08:52

## 2017-07-28 NOTE — PLAN OF CARE
Problem: Goal Outcome Summary  Goal: Goal Outcome Summary  Outcome: Improving  Pain is under control with tylenol and ibuprofen per Pt.  Pt declined to take oxycodone at this time.  Assisted Pt up to bathroom this morning, Pt denied feeling any dizziness when up.  Encouraged Pt to walk in zamudio 2-3 times today.  Bonding well with babies.  Continues to monitor Pt.

## 2017-07-28 NOTE — PLAN OF CARE
Problem: Goal Outcome Summary  Goal: Goal Outcome Summary  Outcome: Improving  VSS.  Fundus firm, bleeding appropriate.  Incision currently covered.  Working on breastfeeding and becoming more independent with infant cares.  Tandem feeding infants.  Up with assistance.  Trevon diaz this AM.  [place] and attentive towards infants. Continue to monitor and educate as appropriate.

## 2017-07-28 NOTE — PLAN OF CARE
Problem: Goal Outcome Summary  Goal: Goal Outcome Summary  Outcome: No Change  VSS. Plan of care reviewed. Nausea better. Naila clear liq. Diet.Incsion C/D/I. Fundus firm @ U, no clots. Walked to bathroom with assist 1-2  For rtae, HS cares.and naila act. Well. Lester drained 325 ml scottie urine.  Pain well controlled with sched Toradol .Beadryl 25 mg po for itching with good relief.  Breast feeding going fair with some good feeds and some attempt -baby sleepy at breast.   Many Family here visiting.  and 4 yr old dtr here and went home for the night.

## 2017-07-28 NOTE — PROGRESS NOTES
POD 1    Doing well    vss afebrile  Abdomen soft and nt  Fundus firm and nt  Incision dry and intact  Extremities nl +1 edema    Assessment  NL POD 1 doing well    Plan routine po care

## 2017-07-28 NOTE — PLAN OF CARE
" referral made, called and left message because pt answered \"hardly ever\" for question #10 on post chio depression scale.  Continues to monitor Pt.  "

## 2017-07-29 PROCEDURE — 12000037 ZZH R&B POSTPARTUM INTERMEDIATE

## 2017-07-29 PROCEDURE — 25000132 ZZH RX MED GY IP 250 OP 250 PS 637: Performed by: OBSTETRICS & GYNECOLOGY

## 2017-07-29 RX ADMIN — IBUPROFEN 800 MG: 400 TABLET ORAL at 16:18

## 2017-07-29 RX ADMIN — POLYETHYLENE GLYCOL 3350 17 G: 17 POWDER, FOR SOLUTION ORAL at 09:37

## 2017-07-29 RX ADMIN — Medication 1 LOZENGE: at 07:36

## 2017-07-29 RX ADMIN — ACETAMINOPHEN 975 MG: 325 TABLET, FILM COATED ORAL at 13:07

## 2017-07-29 RX ADMIN — IBUPROFEN 800 MG: 400 TABLET ORAL at 10:46

## 2017-07-29 RX ADMIN — ACETAMINOPHEN 975 MG: 325 TABLET, FILM COATED ORAL at 05:28

## 2017-07-29 RX ADMIN — ACETAMINOPHEN 975 MG: 325 TABLET, FILM COATED ORAL at 22:13

## 2017-07-29 NOTE — LACTATION NOTE
Initial Lactation visit. Hand out given. Recommend unlimited, frequent breast feedings: At least 8 - 12 times every 24 hours. Avoid pacifiers and supplementation with formula unless medically indicated. Explained benefits of holding baby skin on skin to help promote better breastfeeding outcomes.     Pt states twins are breastfeeding well. She's tandem feeding and feels like her milk is coming in. Will revisit as needed.    Delma Vaughn RN IBCLC

## 2017-07-29 NOTE — PROGRESS NOTES
Afebrile.  Incision is clean and dry.  She is still having pain in incisional area.  Tolerating diet.  Plan home tomorrow

## 2017-07-29 NOTE — PLAN OF CARE
Problem: Goal Outcome Summary  Goal: Goal Outcome Summary  Outcome: Improving  Vital signs stable. Pt is starting to move better today. Swelling still present bilaterally in lower legs. Performing self and infant cares independently with nurse assist as needed. Taking tylenol and ibuprofen for pain control. Ambulation promoted. Voiding w/o difficulty. Continue with plan of care.

## 2017-07-29 NOTE — PLAN OF CARE
Problem: Goal Outcome Summary  Goal: Goal Outcome Summary  Outcome: No Change  VSS. Incision intact. Up to bathroom independently. Pain well controlled with Tylenol. Tandem nursing newborns. Working on  cares with spouse. Breast pump prescription given to pt. Continue to monitor and notify MD as needed.

## 2017-07-30 VITALS
RESPIRATION RATE: 18 BRPM | DIASTOLIC BLOOD PRESSURE: 73 MMHG | HEIGHT: 66 IN | HEART RATE: 74 BPM | TEMPERATURE: 98.7 F | WEIGHT: 250.75 LBS | OXYGEN SATURATION: 100 % | BODY MASS INDEX: 40.3 KG/M2 | SYSTOLIC BLOOD PRESSURE: 118 MMHG

## 2017-07-30 PROCEDURE — 25000132 ZZH RX MED GY IP 250 OP 250 PS 637: Performed by: OBSTETRICS & GYNECOLOGY

## 2017-07-30 RX ORDER — IBUPROFEN 400 MG/1
400 TABLET, FILM COATED ORAL EVERY 6 HOURS PRN
Qty: 40 TABLET | Refills: 0 | Status: SHIPPED | OUTPATIENT
Start: 2017-07-30

## 2017-07-30 RX ORDER — OXYCODONE HYDROCHLORIDE 5 MG/1
5 TABLET ORAL EVERY 4 HOURS PRN
Qty: 30 TABLET | Refills: 0 | Status: SHIPPED | OUTPATIENT
Start: 2017-07-30

## 2017-07-30 RX ADMIN — Medication 2 LOZENGE: at 01:27

## 2017-07-30 RX ADMIN — POLYETHYLENE GLYCOL 3350 17 G: 17 POWDER, FOR SOLUTION ORAL at 09:07

## 2017-07-30 RX ADMIN — OXYCODONE HYDROCHLORIDE 5 MG: 5 TABLET ORAL at 03:41

## 2017-07-30 RX ADMIN — ACETAMINOPHEN 975 MG: 325 TABLET, FILM COATED ORAL at 04:39

## 2017-07-30 RX ADMIN — IBUPROFEN 800 MG: 400 TABLET ORAL at 09:07

## 2017-07-30 RX ADMIN — ACETAMINOPHEN 650 MG: 325 TABLET, FILM COATED ORAL at 11:48

## 2017-07-30 RX ADMIN — OXYCODONE HYDROCHLORIDE 5 MG: 5 TABLET ORAL at 09:07

## 2017-07-30 NOTE — PLAN OF CARE
Problem: Goal Outcome Summary  Goal: Goal Outcome Summary  Outcome: No Change  VSS. Fundus firm at midline. Incision intact. Ambulating independently in hallway and in room. Pain well controlled with Tylenol and Ibuprofen.Tandem nursing twins. Moms milk is in.  Working on  cares with spouse. Continue to monitor and notify MD as needed.

## 2017-07-30 NOTE — PLAN OF CARE
Problem: Goal Outcome Summary  Goal: Goal Outcome Summary  Outcome: Adequate for Discharge Date Met:  07/30/17  Pt on pathway. Taking Ibuprofen/Tylenol/Oxycodone for pain. Ambulating in room. Voiding well. Tandem breastfeeding babies going well. Dc to home today, f/u with md. Will review dc instructions with pt and spouse.

## 2017-07-30 NOTE — PROGRESS NOTES
"SW  D:  Responding to nursing order as patient answered yes to question #10 on the depression scale which asks if patient ever has had thoughts of self harm. She answered the rarely category.     Patient is a 36 year old  female who gave birth to twins requiring  C section.    Met with patient in private.  Explained purpose of meeting.    When writer showed her the question which triggered the visit, she shared that she feels she checked the wrong response  She shares the following.  She does have a history of anxiety and see's a theapist on a weekly basis, Lamont Pereira.  She did have times of feeling overwhelmed during her pregnancy as she was on bed rest and also in the hospital for several weeks however never thought of harming herself and would not as she \"needs to be here for her chidren\".   She identifies she tends to project concerns - the thoughts of \"what if.....She explains her therapy is very helpful in putting her anxiety into perspective.    During the later part of her pregnancy she did feel somewhat distant from her twins, noticing she wasn't reading or talking to them but now she reports she feels she is bonding. Nursing notes reflect bonding also.     Patient describes a good marriage and strong support system as her in laws live with she, her  and her 5 year old daughter.  She and her  have made plans to provide special time to their daughter so she doesn't feel left out.    Gave patient the post partnum depression folder with resources and signs of PPD. Patient plans to continue her weekly therapy sessions.   A:  No further needs identified.  P: No further intervention planned.     "

## 2017-07-30 NOTE — PLAN OF CARE
Problem: Goal Outcome Summary  Goal: Goal Outcome Summary  Outcome: Improving  Vitals remain stable. Pt rated pain at an 8/10 when getting up to the bathroom at 0300 with nurse. Discussed pain options with patient, agreed to take one oxycodone and scheduled tylenol. An hour later pt stated she was feeling much better. Abdominal binder and aqua-k heating pad in use for additional pain relief. Pt bonding well with both infants. Pt's plan is to be discharged to home today pending on a good pain relief plan.

## 2017-07-30 NOTE — PROGRESS NOTES
Afebrile.  Incision is clean and dry.  Tolerating diet.  Pain control is improving.  Discharge instructions given

## 2017-08-03 ENCOUNTER — APPOINTMENT (OUTPATIENT)
Dept: ULTRASOUND IMAGING | Facility: CLINIC | Age: 37
End: 2017-08-03
Attending: EMERGENCY MEDICINE
Payer: COMMERCIAL

## 2017-08-03 ENCOUNTER — APPOINTMENT (OUTPATIENT)
Dept: CARDIOLOGY | Facility: CLINIC | Age: 37
End: 2017-08-03
Attending: EMERGENCY MEDICINE
Payer: COMMERCIAL

## 2017-08-03 ENCOUNTER — HOSPITAL ENCOUNTER (EMERGENCY)
Facility: CLINIC | Age: 37
Discharge: HOME | End: 2017-08-03
Attending: EMERGENCY MEDICINE | Admitting: EMERGENCY MEDICINE
Payer: COMMERCIAL

## 2017-08-03 VITALS
DIASTOLIC BLOOD PRESSURE: 74 MMHG | RESPIRATION RATE: 20 BRPM | SYSTOLIC BLOOD PRESSURE: 124 MMHG | WEIGHT: 248 LBS | TEMPERATURE: 98.2 F | OXYGEN SATURATION: 100 % | BODY MASS INDEX: 38.92 KG/M2 | HEART RATE: 64 BPM | HEIGHT: 67 IN

## 2017-08-03 DIAGNOSIS — R60.0 BILATERAL LEG EDEMA: ICD-10-CM

## 2017-08-03 DIAGNOSIS — R03.0 BORDERLINE HYPERTENSION: ICD-10-CM

## 2017-08-03 LAB
ALBUMIN SERPL-MCNC: 2.6 G/DL (ref 3.4–5)
ALBUMIN UR-MCNC: 10 MG/DL
ALP SERPL-CCNC: 127 U/L (ref 40–150)
ALT SERPL W P-5'-P-CCNC: 61 U/L (ref 0–50)
ANION GAP SERPL CALCULATED.3IONS-SCNC: 7 MMOL/L (ref 3–14)
APPEARANCE UR: CLEAR
AST SERPL W P-5'-P-CCNC: 43 U/L (ref 0–45)
BACTERIA #/AREA URNS HPF: ABNORMAL /HPF
BASOPHILS # BLD AUTO: 0 10E9/L (ref 0–0.2)
BASOPHILS NFR BLD AUTO: 0.3 %
BILIRUB SERPL-MCNC: 0.7 MG/DL (ref 0.2–1.3)
BILIRUB UR QL STRIP: NEGATIVE
BUN SERPL-MCNC: 13 MG/DL (ref 7–30)
CALCIUM SERPL-MCNC: 8.1 MG/DL (ref 8.5–10.1)
CHLORIDE SERPL-SCNC: 107 MMOL/L (ref 94–109)
CO2 SERPL-SCNC: 25 MMOL/L (ref 20–32)
COLOR UR AUTO: YELLOW
CREAT SERPL-MCNC: 0.88 MG/DL (ref 0.52–1.04)
DIFFERENTIAL METHOD BLD: ABNORMAL
EOSINOPHIL # BLD AUTO: 0.1 10E9/L (ref 0–0.7)
EOSINOPHIL NFR BLD AUTO: 1.6 %
ERYTHROCYTE [DISTWIDTH] IN BLOOD BY AUTOMATED COUNT: 14.9 % (ref 10–15)
GFR SERPL CREATININE-BSD FRML MDRD: 72 ML/MIN/1.7M2
GLUCOSE SERPL-MCNC: 78 MG/DL (ref 70–99)
GLUCOSE UR STRIP-MCNC: NEGATIVE MG/DL
HCT VFR BLD AUTO: 31.2 % (ref 35–47)
HGB BLD-MCNC: 10.3 G/DL (ref 11.7–15.7)
HGB UR QL STRIP: ABNORMAL
IMM GRANULOCYTES # BLD: 0 10E9/L (ref 0–0.4)
IMM GRANULOCYTES NFR BLD: 0.2 %
INTERPRETATION ECG - MUSE: NORMAL
INTERPRETATION ECG - MUSE: NORMAL
KETONES UR STRIP-MCNC: 10 MG/DL
LEUKOCYTE ESTERASE UR QL STRIP: ABNORMAL
LYMPHOCYTES # BLD AUTO: 1.4 10E9/L (ref 0.8–5.3)
LYMPHOCYTES NFR BLD AUTO: 22.8 %
MCH RBC QN AUTO: 26 PG (ref 26.5–33)
MCHC RBC AUTO-ENTMCNC: 33 G/DL (ref 31.5–36.5)
MCV RBC AUTO: 79 FL (ref 78–100)
MONOCYTES # BLD AUTO: 0.7 10E9/L (ref 0–1.3)
MONOCYTES NFR BLD AUTO: 11.2 %
NEUTROPHILS # BLD AUTO: 3.9 10E9/L (ref 1.6–8.3)
NEUTROPHILS NFR BLD AUTO: 63.9 %
NITRATE UR QL: NEGATIVE
NRBC # BLD AUTO: 0 10*3/UL
NRBC BLD AUTO-RTO: 0 /100
PH UR STRIP: 7 PH (ref 5–7)
PLATELET # BLD AUTO: 246 10E9/L (ref 150–450)
POTASSIUM SERPL-SCNC: 5 MMOL/L (ref 3.4–5.3)
PROT SERPL-MCNC: 6.5 G/DL (ref 6.8–8.8)
RBC # BLD AUTO: 3.96 10E12/L (ref 3.8–5.2)
RBC #/AREA URNS AUTO: 50 /HPF (ref 0–2)
SODIUM SERPL-SCNC: 139 MMOL/L (ref 133–144)
SP GR UR STRIP: 1.01 (ref 1–1.03)
SQUAMOUS #/AREA URNS AUTO: 1 /HPF (ref 0–1)
T4 FREE SERPL-MCNC: 1.62 NG/DL (ref 0.76–1.46)
TSH SERPL DL<=0.005 MIU/L-ACNC: 0.35 MU/L (ref 0.4–4)
URN SPEC COLLECT METH UR: ABNORMAL
UROBILINOGEN UR STRIP-MCNC: NORMAL MG/DL (ref 0–2)
WBC # BLD AUTO: 6.1 10E9/L (ref 4–11)
WBC #/AREA URNS AUTO: 10 /HPF (ref 0–2)

## 2017-08-03 PROCEDURE — 84439 ASSAY OF FREE THYROXINE: CPT | Performed by: EMERGENCY MEDICINE

## 2017-08-03 PROCEDURE — 85025 COMPLETE CBC W/AUTO DIFF WBC: CPT | Performed by: EMERGENCY MEDICINE

## 2017-08-03 PROCEDURE — 93306 TTE W/DOPPLER COMPLETE: CPT

## 2017-08-03 PROCEDURE — 93970 EXTREMITY STUDY: CPT

## 2017-08-03 PROCEDURE — 81001 URINALYSIS AUTO W/SCOPE: CPT | Performed by: EMERGENCY MEDICINE

## 2017-08-03 PROCEDURE — 84443 ASSAY THYROID STIM HORMONE: CPT | Performed by: EMERGENCY MEDICINE

## 2017-08-03 PROCEDURE — 80053 COMPREHEN METABOLIC PANEL: CPT | Performed by: EMERGENCY MEDICINE

## 2017-08-03 PROCEDURE — 99285 EMERGENCY DEPT VISIT HI MDM: CPT | Mod: 25

## 2017-08-03 PROCEDURE — 87086 URINE CULTURE/COLONY COUNT: CPT | Performed by: EMERGENCY MEDICINE

## 2017-08-03 PROCEDURE — 93306 TTE W/DOPPLER COMPLETE: CPT | Mod: 26 | Performed by: INTERNAL MEDICINE

## 2017-08-03 PROCEDURE — 93005 ELECTROCARDIOGRAM TRACING: CPT

## 2017-08-03 RX ORDER — HYDROCHLOROTHIAZIDE 12.5 MG/1
12.5 TABLET ORAL DAILY
Qty: 10 TABLET | Refills: 1 | Status: SHIPPED | OUTPATIENT
Start: 2017-08-03

## 2017-08-03 ASSESSMENT — ENCOUNTER SYMPTOMS
FATIGUE: 0
NAUSEA: 0
VOMITING: 0
SHORTNESS OF BREATH: 1
COUGH: 0
HEADACHES: 0
PALPITATIONS: 1
DIAPHORESIS: 0

## 2017-08-03 NOTE — ED NOTES
Bed: ED06  Expected date:   Expected time:   Means of arrival:   Comments:  htn swelling post partum (twins)

## 2017-08-03 NOTE — ED AVS SNAPSHOT
Emergency Department    [address] [facility] [address]    Phone:  [phone]    Fax:  [phone]                                       Katie Soto   MRN: [id number]    Department:   Emergency Department   Date of Visit:  8/3/2017           Patient Information     Date Of Birth          1980        Your diagnoses for this visit were:     Bilateral leg edema     Postpartum state     Borderline hypertension        You were seen by Antonette Bal MD.      Follow-up Information     Follow up with Linda Meraz MD. Schedule an appointment as soon as possible for a visit in 4 days.    Specialty:  OB/Gyn    Contact information:    [facility]  [address] [facility] [facility] [address]  [facility] [id number] [phone]          Discharge Instructions       Coping with Edema  What is edema?  Edema is the build-up of fluid in the body, which causes swelling. Swelling most commonly occurs in the feet, ankles, lower legs or hands.  Swelling can occur in the belly or chest may be a sign of a more severe problem.  Certain medicines or conditions can make the swelling worse.  Symptoms include:    Feet and lower legs get larger when you sit or walk.    Hands feel tight when you make a fist.    When you push on the skin, skin stays dented.    Shiny, tight skin.    Fast weight gain.  How is it treated?  Your care team may give you a medicine to reduce the swelling.  They may also suggest that you meet with a dietitian. He or she can help with food choices to reduce the swelling.  What can I do about the swelling?    Place your feet above your heart 3 times a day: Sit with your feet up on a stool with a pillow. Sit on the bed or couch with two pillows under your feet.    Do not stand for long periods of time.    Wear loose-fitting clothes.    Do not cross your legs.    Reduce the salt in your diet.   These foods are high in salt:    Chips, soup    Frozen meals, TV dinners    Wong, lunch meat,  vomiting ham    Sauces (soy, canned spaghetti sauce)    Walk or do other exercise.    Wear compression stockings.    Drink water as normal.    Weigh yourself every day at the same time to keep track of weight gain.  When should I call my care team?  Call your care team if:    You have a hard time breathing.    You gained 5 pounds or more in 1 week.    Your hands or feet feel cold when you touch them.    You are peeing very little or not at all.    Swelling is moving up your arms or legs.    Your tongue is swelling.    You cannot eat for more than a day  If you have any side effects, call us. We can help you manage these problems.  For more information, see:  www.[facility].com  www.cancer.org/treatment/treatmentsandsideeffects/physicalsideeffects/dealingwithsymptomsathome  www.cancer.gov/cancertopics/coping/chemotherapy-and-you  Comments:  __________________________________________  __________________________________________  __________________________________________  __________________________________________  __________________________________________  __________________________________________  __________________________________________  For informational purposes only. Not to replace the advice of your health care provider.  Copyright   2014 [facility]. All rights reserved. [employer] 720774 - REV 03/16.      Leg Swelling in Both Legs    Swelling of the feet, ankles, and legs is called edema. It is caused by excess fluid that has collected in the tissues. Extra fluid in the body settles in the lowest part because of gravity. This is why the legs and feet are most affected.  Some of the causes for edema include:    Disease of the heart like congestive heart failure    Standing or sitting for long periods of time    Infection of the feet or legs    Blood pooling in the veins of your legs (venous insufficiency)    Dilated veins in your lower leg (varicose veins)    Garters or other clothing that is tight on  your legs. This will cause blood to pool in your legs because the clothing limits blood flow.    Some medicines such as hormones like birth control pills, some blood pressure medicines like calcium channel blockers (amlodipine) and steroids, some antidepressants like MAO inhibitors and tricyclics    Menstrual periods that cause you to retain fluids    Many types of renal disease    Liver failure or cirrhosis    Pregnancy, some swelling is normal, but a sudden increase in leg swelling or weight gain can be a sign of a dangerous complication of pregnancy    Poor nutrition    Thyroid disease  Medical treatment will depend on what is causing the swelling in your legs. Your healthcare provider may prescribe water pills (diuretics) to get rid of the extra fluid.  Home care  Follow these guidelines when caring for yourself at home:    Don't wear clothing like garters that is tight on your legs.    Keep your legs up while lying or sitting.    If infection, injury, or recent surgery is causing the swelling, stay off your legs as much as possible until symptoms get better.    If your healthcare provider says that your leg swelling is caused by venous insufficiency or varicose veins, don't sit or  one place for long periods of time. Take breaks and walk about every few hours. Brisk walking is a good exercise. It helps circulate the blood that has collected in your leg. Talk with your provider about using support stockings to stop daytime leg swelling.    If your provider says that heart disease is causing your leg swelling, follow a low-salt diet to stop extra fluid from staying in your body. You may also need medicine.  Follow-up care  Follow up with your healthcare provider, or as advised.  When to seek medical advice  Call your healthcare provider right away if any of these occur:    New shortness of breath or chest pain    Shortness of breath or chest pain that gets worse    Swelling in both legs or ankles that  gets worse    Swelling of the abdomen    Redness, warmth, or swelling in one leg    Fever of 100.4 F (38 C) or higher, or as directed by your healthcare provider    Yellow color to your skin or eyes    Rapid, unexplained weight gain    Having to sleep upright or use an increased number of pillows  Date Last Reviewed: 3/31/2016    3527-6914 [employer]. [address] [place] [address]. All rights reserved. This information is not intended as a substitute for professional medical care. Always follow your healthcare professional's instructions.          24 Hour Appointment Hotline       To make an appointment at any [facility], call [phone] ([phone]). If you don't have a family doctor or clinic, we will help you find one. [facility] are conveniently located to serve the needs of you and your family.             Review of your medicines      START taking        Dose / Directions Last dose taken    hydrochlorothiazide 12.5 MG Tabs tablet   Dose:  12.5 mg   Quantity:  10 tablet        Take 1 tablet (12.5 mg) by mouth daily   Refills:  1          Our records show that you are taking the medicines listed below. If these are incorrect, please call your family doctor or clinic.        Dose / Directions Last dose taken    albuterol 108 (90 BASE) MCG/ACT Inhaler   Commonly known as:  PROAIR HFA/PROVENTIL HFA/VENTOLIN HFA   Dose:  2 puff        Inhale 2 puffs into the lungs   Refills:  0        ibuprofen 400 MG tablet   Commonly known as:  ADVIL/MOTRIN   Dose:  400 mg   Quantity:  40 tablet        Take 1 tablet (400 mg) by mouth every 6 hours as needed for other (cramping)   Refills:  0        oxyCODONE 5 MG IR tablet   Commonly known as:  ROXICODONE   Dose:  5 mg   Quantity:  30 tablet        Take 1 tablet (5 mg) by mouth every 4 hours as needed for moderate to severe pain   Refills:  0        PRENATAL VITAMINS PO   Dose:  1 tablet        Take 1 tablet by mouth daily.   Refills:   0                Prescriptions were sent or printed at these locations (1 Prescription)                   Other Prescriptions                Printed at Department/Unit printer (1 of 1)         hydrochlorothiazide 12.5 MG TABS tablet                Procedures and tests performed during your visit     Procedure/Test Number of Times Performed    CBC with platelets differential 1    Comprehensive metabolic panel 1    EKG 12-lead, tracing only 2    Echocardiogram Complete 1    T4 free 1    TSH with free T4 reflex 1    UA reflex to Microscopic and Culture 1    US Lower Extremity Venous Duplex Bilateral 1    Urine Culture Aerobic Bacterial 1      Orders Needing Specimen Collection     None      Pending Results     Date and Time Order Name Status Description    8/3/2017 1348 Echocardiogram Complete In process     8/3/2017 1335 Urine Culture Aerobic Bacterial In process     8/3/2017 1150 EKG 12-lead, tracing only Preliminary             Pending Culture Results     Date and Time Order Name Status Description    8/3/2017 1335 Urine Culture Aerobic Bacterial In process             Pending Results Instructions     If you had any lab results that were not finalized at the time of your Discharge, you can call the ED Lab Result RN at [phone]. You will be contacted by this team for any positive Lab results or changes in treatment. The nurses are available 7 days a week from 10A to 6:30P.  You can leave a message 24 hours per day and they will return your call.        Test Results From Your Hospital Stay        8/3/2017 12:12 PM      Component Results     Component Value Ref Range & Units Status    WBC 6.1 4.0 - 11.0 10e9/L Final    RBC Count 3.96 3.8 - 5.2 10e12/L Final    Hemoglobin 10.3 (L) 11.7 - 15.7 g/dL Final    Hematocrit 31.2 (L) 35.0 - 47.0 % Final    MCV 79 78 - 100 fl Final    MCH 26.0 (L) 26.5 - 33.0 pg Final    MCHC 33.0 31.5 - 36.5 g/dL Final    RDW 14.9 10.0 - 15.0 % Final    Platelet Count 246 150 - 450 10e9/L  Final    Diff Method Automated Method  Final    % Neutrophils 63.9 % Final    % Lymphocytes 22.8 % Final    % Monocytes 11.2 % Final    % Eosinophils 1.6 % Final    % Basophils 0.3 % Final    % Immature Granulocytes 0.2 % Final    Nucleated RBCs 0 0 /100 Final    Absolute Neutrophil 3.9 1.6 - 8.3 10e9/L Final    Absolute Lymphocytes 1.4 0.8 - 5.3 10e9/L Final    Absolute Monocytes 0.7 0.0 - 1.3 10e9/L Final    Absolute Eosinophils 0.1 0.0 - 0.7 10e9/L Final    Absolute Basophils 0.0 0.0 - 0.2 10e9/L Final    Abs Immature Granulocytes 0.0 0 - 0.4 10e9/L Final    Absolute Nucleated RBC 0.0  Final         8/3/2017 12:30 PM      Component Results     Component Value Ref Range & Units Status    Sodium 139 133 - 144 mmol/L Final    Potassium 5.0 3.4 - 5.3 mmol/L Final    Chloride 107 94 - 109 mmol/L Final    Carbon Dioxide 25 20 - 32 mmol/L Final    Anion Gap 7 3 - 14 mmol/L Final    Glucose 78 70 - 99 mg/dL Final    Urea Nitrogen 13 7 - 30 mg/dL Final    Creatinine 0.88 0.52 - 1.04 mg/dL Final    GFR Estimate 72 >60 mL/min/1.7m2 Final    Non  GFR Calc    GFR Estimate If Black 87 >60 mL/min/1.7m2 Final    African American GFR Calc    Calcium 8.1 (L) 8.5 - 10.1 mg/dL Final    Bilirubin Total 0.7 0.2 - 1.3 mg/dL Final    Albumin 2.6 (L) 3.4 - 5.0 g/dL Final    Protein Total 6.5 (L) 6.8 - 8.8 g/dL Final    Alkaline Phosphatase 127 40 - 150 U/L Final    ALT 61 (H) 0 - 50 U/L Final    AST 43 0 - 45 U/L Final         8/3/2017 12:37 PM      Component Results     Component Value Ref Range & Units Status    TSH 0.35 (L) 0.40 - 4.00 mU/L Final         8/3/2017  1:47 PM      Component Results     Component Value Ref Range & Units Status    Color Urine Yellow  Final    Appearance Urine Clear  Final    Glucose Urine Negative NEG mg/dL Final    Bilirubin Urine Negative NEG Final    Ketones Urine 10 (A) NEG mg/dL Final    Specific Gravity Urine 1.009 1.003 - 1.035 Final    Blood Urine Moderate (A) NEG Final    pH Urine  7.0 5.0 - 7.0 pH Final    Protein Albumin Urine 10 (A) NEG mg/dL Final    Urobilinogen mg/dL Normal 0.0 - 2.0 mg/dL Final    Nitrite Urine Negative NEG Final    Leukocyte Esterase Urine Moderate (A) NEG Final    Source Midstream Urine  Final    RBC Urine 50 (H) 0 - 2 /HPF Final    WBC Urine 10 (H) 0 - 2 /HPF Final    Bacteria Urine Few (A) NEG /HPF Final    Squamous Epithelial /HPF Urine 1 0 - 1 /HPF Final         8/3/2017  1:02 PM      Narrative     VENOUS ULTRASOUND BOTH LEGS  8/3/2017 12:35 PM     HISTORY: post partum, leg swelling    COMPARISON: None.    FINDINGS:  Examination of the deep veins with graded compression and  color flow Doppler with spectral wave form analysis shows no evidence  of thrombus in the common femoral vein, femoral vein, popliteal vein  or calf veins.          Impression     IMPRESSION: No evidence of deep venous thrombosis.     ALONSO ALISHA,          8/3/2017 12:49 PM      Component Results     Component Value Ref Range & Units Status    T4 Free 1.62 (H) 0.76 - 1.46 ng/dL Final         8/3/2017  1:48 PM                Clinical Quality Measure: Blood Pressure Screening     Your blood pressure was checked while you were in the emergency department today. The last reading we obtained was  BP: 144/81 . Please read the guidelines below about what these numbers mean and what you should do about them.  If your systolic blood pressure (the top number) is less than 120 and your diastolic blood pressure (the bottom number) is less than 80, then your blood pressure is normal. There is nothing more that you need to do about it.  If your systolic blood pressure (the top number) is 120-139 or your diastolic blood pressure (the bottom number) is 80-89, your blood pressure may be higher than it should be. You should have your blood pressure rechecked within a year by a primary care provider.  If your systolic blood pressure (the top number) is 140 or greater or your diastolic blood pressure (the  "bottom number) is 90 or greater, you may have high blood pressure. High blood pressure is treatable, but if left untreated over time it can put you at risk for heart attack, stroke, or kidney failure. You should have your blood pressure rechecked by a primary care provider within the next 4 weeks.  If your provider in the emergency department today gave you specific instructions to follow-up with your doctor or provider even sooner than that, you should follow that instruction and not wait for up to 4 weeks for your follow-up visit.        Thank you for choosing [facility]       Thank you for choosing [facility] for your care. Our goal is always to provide you with excellent care. Hearing back from our patients is one way we can continue to improve our services. Please take a few minutes to complete the written survey that you may receive in the mail after you visit with us. Thank you!        [employer] Information     [employer] lets you send messages to your doctor, view your test results, renew your prescriptions, schedule appointments and more. To sign up, go to [email or web address] . Click on \"Log in\" on the left side of the screen, which will take you to the Welcome page. Then click on \"Sign up Now\" on the right side of the page.     You will be asked to enter the access code listed below, as well as some personal information. Please follow the directions to create your username and password.     Your access code is: [email or web address]  Expires: 10/28/2017  9:53 AM     Your access code will  in 90 days. If you need help or a new code, please call your [facility] clinic or [phone].        Care EveryWhere ID     This is your Care EveryWhere ID. This could be used by other organizations to access your [facility] medical records  [id number]        Equal Access to Services     PRANEETH SOOD : mirza Lewis qaybta kaalmada adeegyada, waxay idiin hayaan adeeg kharash la'aan ah. So deep " [phone].    ATENCIÓN: Si habla español, tiene a rodas disposición servicios gratuitos de asistencia lingüística. Llame al [phone].    We comply with applicable federal civil rights laws and [place] laws. We do not discriminate on the basis of race, color, national origin, age, disability sex, sexual orientation or gender identity.            After Visit Summary       This is your record. Keep this with you and show to your community pharmacist(s) and doctor(s) at your next visit.

## 2017-08-03 NOTE — ED AVS SNAPSHOT
Emergency Department    [address] [place] [address]    Phone:  [phone]    Fax:  [phone]                                       Katie Soto   MRN: [id number]    Department:   Emergency Department   Date of Visit:  8/3/2017           After Visit Summary Signature Page     I have received my discharge instructions, and my questions have been answered. I have discussed any challenges I see with this plan with the nurse or doctor.    ..........................................................................................................................................  Patient/Patient Representative Signature      ..........................................................................................................................................  Patient Representative Print Name and Relationship to Patient    ..................................................               ................................................  Date                                            Time    ..........................................................................................................................................  Reviewed by Signature/Title    ...................................................              ..............................................  Date                                                            Time

## 2017-08-03 NOTE — DISCHARGE INSTRUCTIONS
Coping with Edema  What is edema?  Edema is the build-up of fluid in the body, which causes swelling. Swelling most commonly occurs in the feet, ankles, lower legs or hands.  Swelling can occur in the belly or chest may be a sign of a more severe problem.  Certain medicines or conditions can make the swelling worse.  Symptoms include:    Feet and lower legs get larger when you sit or walk.    Hands feel tight when you make a fist.    When you push on the skin, skin stays dented.    Shiny, tight skin.    Fast weight gain.  How is it treated?  Your care team may give you a medicine to reduce the swelling.  They may also suggest that you meet with a dietitian. He or she can help with food choices to reduce the swelling.  What can I do about the swelling?    Place your feet above your heart 3 times a day: Sit with your feet up on a stool with a pillow. Sit on the bed or couch with two pillows under your feet.    Do not stand for long periods of time.    Wear loose-fitting clothes.    Do not cross your legs.    Reduce the salt in your diet.   These foods are high in salt:    Chips, soup    Frozen meals, TV dinners    Wong, lunch meat, ham    Sauces (soy, canned spaghetti sauce)    Walk or do other exercise.    Wear compression stockings.    Drink water as normal.    Weigh yourself every day at the same time to keep track of weight gain.  When should I call my care team?  Call your care team if:    You have a hard time breathing.    You gained 5 pounds or more in 1 week.    Your hands or feet feel cold when you touch them.    You are peeing very little or not at all.    Swelling is moving up your arms or legs.    Your tongue is swelling.    You cannot eat for more than a day  If you have any side effects, call us. We can help you manage these problems.  For more information,  see:  www.chemocare.com  www.cancer.org/treatment/treatmentsandsideeffects/physicalsideeffects/dealingwithsymptomsathome  www.cancer.gov/cancertopics/coping/chemotherapy-and-you  Comments:  __________________________________________  __________________________________________  __________________________________________  __________________________________________  __________________________________________  __________________________________________  __________________________________________  For informational purposes only. Not to replace the advice of your health care provider.  Copyright   2014 [facility]. All rights reserved. [employer] 179189 - REV 03/16.      Leg Swelling in Both Legs    Swelling of the feet, ankles, and legs is called edema. It is caused by excess fluid that has collected in the tissues. Extra fluid in the body settles in the lowest part because of gravity. This is why the legs and feet are most affected.  Some of the causes for edema include:    Disease of the heart like congestive heart failure    Standing or sitting for long periods of time    Infection of the feet or legs    Blood pooling in the veins of your legs (venous insufficiency)    Dilated veins in your lower leg (varicose veins)    Garters or other clothing that is tight on your legs. This will cause blood to pool in your legs because the clothing limits blood flow.    Some medicines such as hormones like birth control pills, some blood pressure medicines like calcium channel blockers (amlodipine) and steroids, some antidepressants like MAO inhibitors and tricyclics    Menstrual periods that cause you to retain fluids    Many types of renal disease    Liver failure or cirrhosis    Pregnancy, some swelling is normal, but a sudden increase in leg swelling or weight gain can be a sign of a dangerous complication of pregnancy    Poor nutrition    Thyroid disease  Medical treatment will depend on what is causing the  swelling in your legs. Your healthcare provider may prescribe water pills (diuretics) to get rid of the extra fluid.  Home care  Follow these guidelines when caring for yourself at home:    Don't wear clothing like garters that is tight on your legs.    Keep your legs up while lying or sitting.    If infection, injury, or recent surgery is causing the swelling, stay off your legs as much as possible until symptoms get better.    If your healthcare provider says that your leg swelling is caused by venous insufficiency or varicose veins, don't sit or  one place for long periods of time. Take breaks and walk about every few hours. Brisk walking is a good exercise. It helps circulate the blood that has collected in your leg. Talk with your provider about using support stockings to stop daytime leg swelling.    If your provider says that heart disease is causing your leg swelling, follow a low-salt diet to stop extra fluid from staying in your body. You may also need medicine.  Follow-up care  Follow up with your healthcare provider, or as advised.  When to seek medical advice  Call your healthcare provider right away if any of these occur:    New shortness of breath or chest pain    Shortness of breath or chest pain that gets worse    Swelling in both legs or ankles that gets worse    Swelling of the abdomen    Redness, warmth, or swelling in one leg    Fever of 100.4 F (38 C) or higher, or as directed by your healthcare provider    Yellow color to your skin or eyes    Rapid, unexplained weight gain    Having to sleep upright or use an increased number of pillows  Date Last Reviewed: 3/31/2016    2258-7784 [employer]. [address] [place] [address], [place] [address]. All rights reserved. This information is not intended as a substitute for professional medical care. Always follow your healthcare professional's instructions.

## 2017-08-03 NOTE — ED PROVIDER NOTES
History     Chief Complaint:  Hypertension & Leg Swelling    HPI   Katie Soto is a 36 year old female who presents with her  to the ED for evaluation of hypertension and leg swelling. The patient reports she had a  and delivered twins on 2017. The patient had a clinic visit with her OB/GYN today who advised her to visit the ED with concern for hypertension and leg swelling. The patient reports she has no history of hypertension before or during her pregnancy. The patient notes she has leg swelling that has worsened since pregnancy. The patient has tried elevation; however, this does not help, and the patient feels short of breath while elevated. The patient also reports mild shortness of breath and palpations. The patient denies any cough, diaphoresis, headache, nausea, vomiting, or fatigue.        Allergies:  Penicillins  Codeine  Clindamycin  Vancomycin     Medications:    oxyCODONE (ROXICODONE) 5 MG IR tablet   ibuprofen (ADVIL/MOTRIN) 400 MG tablet   albuterol (PROAIR HFA/PROVENTIL HFA/VENTOLIN HFA) 108 (90 BASE) MCG/ACT Inhaler   PRENATAL VITAMINS PO     Past Medical History:    Allergic rhinitis  Anemia  Asthma  Ovarian cyst    Past Surgical History:     (2017)  [place] teeth removal  HC removal of ovarian cysts  Laparoscopic salpingo-oophorectomy    Family History:    History reviewed. No pertinent family history.     Social History:  Smoking status: Never smoker  Alcohol use: No  Presents to ED with   Marital Status:   [2]     Review of Systems   Constitutional: Negative for diaphoresis and fatigue.   Respiratory: Positive for shortness of breath. Negative for cough.    Cardiovascular: Positive for palpitations and leg swelling.   Gastrointestinal: Negative for nausea and vomiting.   Neurological: Negative for headaches.   All other systems reviewed and are negative.    Physical Exam     Patient Vitals for the past 24 hrs:   BP Temp Temp src Pulse  "Resp SpO2 Height Weight   08/03/17 1511 124/74 - - 64 20 100 % - -   08/03/17 1153 144/81 98.2  F (36.8  C) Oral 60 20 99 % 1.702 m (5' 7\") 112.5 kg (248 lb)        Physical Exam  General: Patient is alert and normal appearing.  HEENT: Head atraumatic    Eyes: pupils equal and reactive. Conjunctiva clear   Nares: patent   Oropharynx: no lesions, uvula midline, no palatal draping, normal voice, no trismus  Neck: Supple without lymphadenopathy, no meningismus  Chest: Heart regular rate and rhythm.   Lungs: Equal clear to auscultation with no wheeze or rales  Abdomen: Soft, non tender, C section incision c, d, I and no erythema, nondistended, normal bowel sounds  Back: No costovertebral angle tenderness, no midline C, T or L spine tenderness  Neuro: Grossly nonfocal, normal speech, strength equal bilaterally, CN 2-12 intact  Extremities: No deformities, equal radial and DP pulses. No clubbing, cyanosis.  Significant bilateral lower extremity edema, compartments soft and distal neurovascularly intact  Skin: Warm and dry with no rash.       Emergency Department Course     ECG (11:53:50):  Rate 67 bpm. KS interval 140. QRS duration 74. QT/QTc 404/426. P-R-T axes 49 39 23. Normal sinus rhythm. Possible left atrial enlargement. Borderline ECG. Interpreted at 1200 by Antonette Bal MD.    Imaging:  Radiographic findings were communicated with the patient who voiced understanding of the findings.    US Lower Extremity Venous Duplex Bilateral  IMPRESSION: No evidence of deep venous thrombosis. As read by Radiology.    Laboratory:  T4: 1.62(H)  TSH: 0.35(L)  UA: Urineketon 10, Blood moderate, Protein albumin 10, Leukocyte esterase moderate, RBC 50(H), WBC 10(H), Bacteria few  CBC: HGB 10.3(L), o/w WNL (WBC 6.1, )   CMP: Calcium 8.1(L), Albumin 2.6(L), Protein total 6.5(L), Alt 61(H), o/w WNL (Creatinine 0.88)    Urine culture aerobic bacterial: In process    Emergency Department Course:  Past medical records, nursing " notes, and vitals reviewed.  1150: I performed an exam of the patient and obtained history, as documented above.  IV inserted and blood drawn.    The patient was sent for a bilateral lower extremity venous duplex ultra-sound while in the emergency department, findings above.    1220: I rechecked the patient. Explained findings to patient.    1346: I spoke with patient's OB/GYN.    1400: I discussed with echo tech    1400 repeat /78.  Unlikely preeclampsia given improved BP.      1424: I rechecked the patient. Findings and plan explained to the Patient. Patient discharged home with instructions regarding supportive care, medications, and reasons to return. The importance of close follow-up was reviewed.     Impression & Plan      Medical Decision Making:  Katie Soto is a 36 year old female, one week post-partum with twins, who presents with lower extremity edema from the OB/GYN clinic with borderline hypertension. She has significant lower extremity edema, borderline hypertension at the clinic, was sent here for further work-up. She has had no dysuria, urgency, or frequency, but she is having vaginal bleeding post-partum.  I feel UAis likely contaminated and without overt symptoms of UTI will send for culture. If cultures come back positive, we can call her back and start her on antibiotics. Her TSH is 0.35 and T4 is 1.62 which seems reasonable for pregnancy state immediately post-partum. Bilateral lower extremity ultrasound is negative for DVT.  Stat echocardiogram done by the echo tech shows normal EF with no evidence of post-partum cardiomyopathy. Patient's repeat blood pressure here without any intervention is 118/78. The patient has normal hemoglobin for post-partum of 10.3 and improved from her discharge which was 9.1. Platelets are normal. No evidence of thrombocytopenia. I do not suspect HELLP syndrome. Alt is very minimally elevated at 61. AST is normal. Platelets are normal and blood pressures  improved on their own. I discussed the findings with her OB/GYN who is agreeable with the plan of starting Hydrochlorothiazide and close follow-up of her blood pressure recheck early next week. Patient is agreeable with this plan and all questions or concerns addressed.       Diagnosis:    ICD-10-CM   1. Bilateral leg edema R60.0   2. Postpartum state Z39.2   3. Borderline hypertension R03.0     Disposition: Patient discharged to home    Discharge Medications:   Details   hydrochlorothiazide 12.5 MG TABS tablet Take 1 tablet (12.5 mg) by mouth daily, Disp-10 tablet, R-1, Local Print     Erlinda Robert  8/3/2017    EMERGENCY DEPARTMENT    I, Erlinda Robert, am serving as a scribe at 11:50 AM on 8/3/2017 to document services personally performed by Antonette Bal MD based on my observations and the provider's statements to me.        Antonette Bal MD  08/03/17 2012

## 2017-08-04 LAB
BACTERIA SPEC CULT: NORMAL
Lab: NORMAL
MICRO REPORT STATUS: NORMAL
SPECIMEN SOURCE: NORMAL

## 2017-08-23 NOTE — DISCHARGE SUMMARY
DATE OF ADMISSION:  2017.        DATE OF DISCHARGE:  2017.       DIAGNOSES ON ADMISSION:  Di-di twin intrauterine pregnancy at 32 and 0/7 weeks with no significant  labor, but advanced cervical dilation.      HOSPITAL COURSE:  Hitesh Angel was admitted, given magnesium sulfate IV, received betamethasone x2 and nifedipine.  She remained in the hospital until delivery.  She remained on oral nifedipine through 34 weeks; she has been taking off nifedipine and allowed to ambulate.  At 37 weeks, she did begin actively loki on her own and was brought back to the OR for a vaginal delivery.  Attempted vaginal delivery of twin A with rupture of membranes showed cord prolapse.  At that point, a code  was called and she underwent an uncomplicated primary  section for vertex/vertex twins.  She did receive cell saver during the procedure due to her declining any blood transfusions due to Islam beliefs; these were re-transfused to the patient.  By postoperative day #3, she had reached all routine milestones; she was ambulating, tolerating oral diet and voiding on her own.  By postoperative day #3, she was discharged home with routine teaching and was to followup in the clinic 6 weeks postpartum.         MALACHI ARCHIBALD MD             D: 2017 08:33   T: 2017 10:14   MT: [id number]      Name:     HITESH ANGEL   MRN:      [id number]        Account:        [id number]   :      1980           Admit Date:     [id number]                                  Discharge Date: 2017      Document: [id number]

## 2017-08-28 NOTE — PROGRESS NOTES
NST performed at 4667-1506. Baby A reactive with accels. Baby B , moderate variability. No accels, no decels. Dr Godfrey was sent electronic page to inform her of resuts of NST.   See occupational health nurse/ma/tech visit only

## 2017-10-10 ENCOUNTER — HOSPITAL PATHOLOGY (OUTPATIENT)
Dept: OTHER | Facility: CLINIC | Age: 37
End: 2017-10-10

## 2017-10-11 LAB — COPATH REPORT: NORMAL

## 2017-11-13 LAB
BILE AC SERPL-SCNC: 0.3 UMOL/L
CDCAE SERPL-SCNC: 0.8 UMOL/L (ref 0–3.4)
CHOLATE SERPL-SCNC: 2 UMOL/L
DO-CHOLATE SERPL-SCNC: 0.6 UMOL/L
URSODEOXYCHOLATE SERPL-SCNC: 0.3 UMOL/L (ref 0–1)

## 2017-12-07 ENCOUNTER — DOCUMENTATION ONLY (OUTPATIENT)
Dept: OTHER | Facility: CLINIC | Age: 37
End: 2017-12-07

## 2017-12-07 PROBLEM — Z71.89 ADVANCED DIRECTIVES, COUNSELING/DISCUSSION: Chronic | Status: ACTIVE | Noted: 2017-12-07

## 2019-02-15 ENCOUNTER — HEALTH MAINTENANCE LETTER (OUTPATIENT)
Age: 39
End: 2019-02-15

## 2019-11-03 ENCOUNTER — HEALTH MAINTENANCE LETTER (OUTPATIENT)
Age: 39
End: 2019-11-03

## 2020-11-16 ENCOUNTER — HEALTH MAINTENANCE LETTER (OUTPATIENT)
Age: 40
End: 2020-11-16

## 2021-09-18 ENCOUNTER — HEALTH MAINTENANCE LETTER (OUTPATIENT)
Age: 41
End: 2021-09-18

## 2022-01-08 ENCOUNTER — HEALTH MAINTENANCE LETTER (OUTPATIENT)
Age: 42
End: 2022-01-08

## 2022-11-19 ENCOUNTER — HEALTH MAINTENANCE LETTER (OUTPATIENT)
Age: 42
End: 2022-11-19

## 2023-04-15 ENCOUNTER — HEALTH MAINTENANCE LETTER (OUTPATIENT)
Age: 43
End: 2023-04-15

## 2024-06-17 PROBLEM — Z71.89 ADVANCED DIRECTIVES, COUNSELING/DISCUSSION: Status: RESOLVED | Noted: 2017-12-07 | Resolved: 2024-06-17

## 2024-11-23 ENCOUNTER — OFFICE VISIT (OUTPATIENT)
Dept: URGENT CARE | Facility: URGENT CARE | Age: 44
End: 2024-11-23
Payer: COMMERCIAL

## 2024-11-23 VITALS
RESPIRATION RATE: 16 BRPM | WEIGHT: 293 LBS | BODY MASS INDEX: 46.23 KG/M2 | HEART RATE: 75 BPM | OXYGEN SATURATION: 99 % | TEMPERATURE: 98 F | DIASTOLIC BLOOD PRESSURE: 86 MMHG | SYSTOLIC BLOOD PRESSURE: 136 MMHG

## 2024-11-23 DIAGNOSIS — R68.89 FLU-LIKE SYMPTOMS: ICD-10-CM

## 2024-11-23 DIAGNOSIS — J36 PERITONSILLAR CELLULITIS: Primary | ICD-10-CM

## 2024-11-23 LAB
DEPRECATED S PYO AG THROAT QL EIA: NEGATIVE
FLUAV AG SPEC QL IA: NEGATIVE
FLUBV AG SPEC QL IA: NEGATIVE

## 2024-11-23 PROCEDURE — 99203 OFFICE O/P NEW LOW 30 MIN: CPT | Performed by: FAMILY MEDICINE

## 2024-11-23 PROCEDURE — 87635 SARS-COV-2 COVID-19 AMP PRB: CPT | Performed by: FAMILY MEDICINE

## 2024-11-23 PROCEDURE — 87651 STREP A DNA AMP PROBE: CPT | Performed by: FAMILY MEDICINE

## 2024-11-23 PROCEDURE — 87804 INFLUENZA ASSAY W/OPTIC: CPT | Performed by: FAMILY MEDICINE

## 2024-11-23 RX ORDER — OFLOXACIN 3 MG/ML
SOLUTION/ DROPS OPHTHALMIC
COMMUNITY
Start: 2023-12-05

## 2024-11-23 RX ORDER — AZITHROMYCIN 500 MG/1
1 TABLET, FILM COATED ORAL
COMMUNITY
Start: 2024-09-24

## 2024-11-23 RX ORDER — CEFDINIR 300 MG/1
300 CAPSULE ORAL 2 TIMES DAILY
Qty: 20 CAPSULE | Refills: 0 | Status: SHIPPED | OUTPATIENT
Start: 2024-11-23 | End: 2024-12-03

## 2024-11-24 LAB — GROUP A STREP BY PCR: NOT DETECTED

## 2024-11-24 NOTE — PROGRESS NOTES
ASSESSMENT/PLAN:      ICD-10-CM    1. Peritonsillar cellulitis  J36 Streptococcus A Rapid Screen w/Reflex to PCR - Clinic Collect     Group A Streptococcus PCR Throat Swab     cefdinir (OMNICEF) 300 MG capsule      2. Flu-like symptoms  R68.89 Influenza A & B Antigen - Clinic Collect     COVID-19 Virus (Coronavirus) by PCR Nose    Negative          Patient Instructions     Start cefdinir 2 times a day for 10 days for tonsillitis-PCR test for strep-second test if positive, cefdinir will treat the strep throat    If your second strep test is positive.  You will need to be on antibiotic treatment for 24  hours before returning to school/work.  Change your toothbrush in 3 days to prevent reinfection.    For your sore throat, you may try tylenol/ibuprofen, salt water gargles, throat lozenges, warm beverages.    Make sure to drink plenty of fluids and wash your hands often.    For pain    Start  Ibuprofen (motrin/advil)  600 mg 3 times a day am, midday and bedtime  always take with food for the next  2 to 3 days until pain resolves-maximum dose of ibuprofen is 1800 mg in 24 hours     If pain before due for next ibuprofen dose take     Acetaminophen (Tylenol ) 1000 mg 3 times a day for the next 2 to 3 days until pain resolves-maximum dose of acetaminophen (tylenol)  is 3000 mg in 24-hours       If one tonsil much larger than the other unable to swallow your saliva, to ER  \    Follow up with your primary care provider or clinic in about 2-3 days  if your symptoms do not improve            Reviewed medication instructions and side effects. Follow up if experiences side effects.     I reviewed supportive care, otc meds to use if needed, expected course, and signs of concern.  Follow up as needed or if she does not improve within  1-2 days or if worsens in any way.  Reviewed red flag symptoms and is to go to the ER if experiences any of these.     The use of Dragon/[employer] dictation services may have been used to  construct the content in this note; any grammatical or spelling errors are non-intentional. Please contact the author of this note directly if you are in need of any clarification.                  Patient presents with:  Pharyngitis: Started yesterday; noticed white spot       Subjective     Katie Soto is a 43 year old female who presents to clinic today for the following health issues:    HPI     Acute Illness  Acute illness concerns: Onset today of sore throat and pain in the right tonsil, fever and chills, yesterday headaches, fatigue, cough congestion    Symptoms:  Fever: YES  Chills/Sweats: YES-chills  Headache (location?): YES-frontal  Sinus Pressure: No  Conjunctivitis:  No  Ear Pain: no  Rhinorrhea: YES  Congestion: YES  Sore Throat: YES  Cough: YES-productive   Wheeze: No  Decreased Appetite: No  Nausea: No  Vomiting: No  Diarrhea: No  Fatigue/Achiness: YES  Sick/Strep Exposure: YES  Therapies tried and outcome:   Tylenol and ibuprofen    Past Medical History:   Diagnosis Date    Allergic rhinitis, cause unspecified 4/2007    see consult from allergy - severe allergic rhinitis - dustmites, cockroachs, weed pollen, cats and severe anaphylaxis reaction to shellfish and allergy to treenuts -tx with allegra, flonase, pataday, epi pen and possibly allergy shots if needed     Anemia     with pregnancy    Asthma     as child    Other and unspecified ovarian cyst      Social History     Tobacco Use    Smoking status: Never    Smokeless tobacco: Never   Substance Use Topics    Alcohol use: No       Current Outpatient Medications   Medication Sig Dispense Refill    azithromycin (ZITHROMAX) 500 MG tablet Take 1 tablet by mouth daily at 2 pm.      cefdinir (OMNICEF) 300 MG capsule Take 1 capsule (300 mg) by mouth 2 times daily for 10 days. 20 capsule 0    ofloxacin (OCUFLOX) 0.3 % ophthalmic solution INSTILL 1 TO 2 DROPS IN EACH EYE FOUR TIMES DAILY       Allergies   Allergen Reactions    Pcn [Penicillins]  Anaphylaxis     Hives itching    Shellfish Allergy Anaphylaxis    Morphine And Codeine Difficulty breathing    Tree Nuts  [Nuts] Hives    Clindamycin Rash    Vancomycin Rash             ROS are negative, except as otherwise noted HPI      Objective    /86   Pulse 75   Temp 98  F (36.7  C) (Tympanic)   Resp 16   Wt 133.9 kg (295 lb 3.2 oz)   SpO2 99%   Breastfeeding No   BMI 46.23 kg/m    Body mass index is 46.23 kg/m .  Physical Exam     GENERAL:  Alert and oriented x 3.  No acute distress.   HEENT: Diffuse pharyngeal erythema. Tonsils erythematous enlarged right greater than left uvula midline.  Sclera, lids and conjunctiva are normal.  Nose congestion and ears clear.  NECK: Shotty anterior chain adenopathy adenopathy.  CHEST:  clear, no wheezing or rales.   HEART:  S1 and S2 normal, no murmurs, clicks, gallops or rubs. Regular rate and rhythm.  NEURO:Alert and oriented x3, normal strength and tone, normal gait      Diagnostic Test Results:  Labs reviewed in Epic  Results for orders placed or performed in visit on 11/23/24   Streptococcus A Rapid Screen w/Reflex to PCR - Clinic Collect     Status: Normal    Specimen: Throat; Swab   Result Value Ref Range    Group A Strep antigen Negative Negative   Influenza A & B Antigen - Clinic Collect     Status: Normal    Specimen: Nose; Swab   Result Value Ref Range    Influenza A antigen Negative Negative    Influenza B antigen Negative Negative    Narrative    Test results must be correlated with clinical data. If necessary, results should be confirmed by a molecular assay or viral culture.

## 2024-11-24 NOTE — PATIENT INSTRUCTIONS
Start cefdinir 2 times a day for 10 days for tonsillitis-PCR test for strep-second test if positive, cefdinir will treat the strep throat    If your second strep test is positive.  You will need to be on antibiotic treatment for 24  hours before returning to school/work.  Change your toothbrush in 3 days to prevent reinfection.    For your sore throat, you may try tylenol/ibuprofen, salt water gargles, throat lozenges, warm beverages.    Make sure to drink plenty of fluids and wash your hands often.    For pain    Start  Ibuprofen (motrin/advil)  600 mg 3 times a day am, midday and bedtime  always take with food for the next  2 to 3 days until pain resolves-maximum dose of ibuprofen is 1800 mg in 24 hours     If pain before due for next ibuprofen dose take     Acetaminophen (Tylenol ) 1000 mg 3 times a day for the next 2 to 3 days until pain resolves-maximum dose of acetaminophen (tylenol)  is 3000 mg in 24-hours       If one tonsil much larger than the other unable to swallow your saliva, to ER  \    Follow up with your primary care provider or clinic in about 2-3 days  if your symptoms do not improve

## 2024-11-25 LAB — SARS-COV-2 RNA RESP QL NAA+PROBE: NEGATIVE

## 2024-12-29 ENCOUNTER — HEALTH MAINTENANCE LETTER (OUTPATIENT)
Age: 44
End: 2024-12-29

## (undated) DEVICE — GLOVE PROTEXIS BLUE W/NEU-THERA 7.0  2D73EB70

## (undated) DEVICE — BLADE CLIPPER 4406

## (undated) DEVICE — PACK C-SECTION LF PL15OTA83B

## (undated) DEVICE — LINEN C-SECTION 5415

## (undated) DEVICE — SU MONOCRYL 0 CTX 36" Y398H

## (undated) DEVICE — ESU GROUND PAD UNIVERSAL W/O CORD

## (undated) DEVICE — PREP CHLORAPREP 26ML TINTED ORANGE  260815

## (undated) DEVICE — SU VICRYL 0 CT-1 27" J340H

## (undated) DEVICE — SOL NACL 0.9% IRRIG 1000ML BOTTLE 07138-09

## (undated) DEVICE — GLOVE PROTEXIS W/NEU-THERA 7.0  2D73TE70

## (undated) DEVICE — DRSG TEGADERM 4X10" 1627

## (undated) DEVICE — SUCTION CANISTER MEDIVAC LINER 3000ML W/LID 65651-530

## (undated) DEVICE — CATH TRAY FOLEY 16FR BARDEX W/DRAIN BAG STATLOCK 300316A